# Patient Record
Sex: MALE | Race: WHITE | Employment: OTHER | URBAN - NONMETROPOLITAN AREA
[De-identification: names, ages, dates, MRNs, and addresses within clinical notes are randomized per-mention and may not be internally consistent; named-entity substitution may affect disease eponyms.]

---

## 2017-12-19 ENCOUNTER — HOSPITAL ENCOUNTER (OUTPATIENT)
Dept: CT IMAGING | Age: 77
Discharge: HOME | End: 2017-12-19
Payer: MEDICARE

## 2017-12-19 ENCOUNTER — HOSPITAL ENCOUNTER (OUTPATIENT)
Dept: NON INVASIVE DIAGNOSTICS | Age: 77
Discharge: HOME | End: 2017-12-19
Payer: MEDICARE

## 2017-12-19 DIAGNOSIS — Z87.891 PERSONAL HISTORY OF TOBACCO USE, PRESENTING HAZARDS TO HEALTH: ICD-10-CM

## 2017-12-19 PROCEDURE — G0297 LDCT FOR LUNG CA SCREEN: HCPCS

## 2017-12-19 PROCEDURE — 93880 EXTRACRANIAL BILAT STUDY: CPT

## 2018-06-18 ENCOUNTER — HOSPITAL ENCOUNTER (OUTPATIENT)
Dept: NON INVASIVE DIAGNOSTICS | Age: 78
Discharge: HOME | End: 2018-06-18
Payer: MEDICARE

## 2018-06-18 PROCEDURE — 93880 EXTRACRANIAL BILAT STUDY: CPT

## 2019-08-14 ENCOUNTER — HOSPITAL ENCOUNTER (OUTPATIENT)
Dept: VASCULAR LAB | Age: 79
Discharge: HOME | End: 2019-08-14
Payer: MEDICARE

## 2019-08-14 DIAGNOSIS — I65.21 STENOSIS OF RIGHT CAROTID ARTERY: Primary | ICD-10-CM

## 2019-08-14 PROCEDURE — 93880 EXTRACRANIAL BILAT STUDY: CPT

## 2019-11-14 RX ORDER — PANTOPRAZOLE SODIUM 40 MG/1
40 TABLET, DELAYED RELEASE ORAL DAILY
COMMUNITY

## 2019-11-14 RX ORDER — CLONIDINE HYDROCHLORIDE 0.1 MG/1
0.1 TABLET ORAL 2 TIMES DAILY
COMMUNITY

## 2019-11-14 RX ORDER — HYDROCODONE BITARTRATE AND ACETAMINOPHEN 10; 325 MG/1; MG/1
1 TABLET ORAL EVERY 6 HOURS PRN
Status: ON HOLD | COMMUNITY
End: 2020-05-15 | Stop reason: HOSPADM

## 2019-11-14 RX ORDER — ALBUTEROL SULFATE 90 UG/1
2 AEROSOL, METERED RESPIRATORY (INHALATION) EVERY 6 HOURS PRN
COMMUNITY

## 2019-11-14 RX ORDER — ATORVASTATIN CALCIUM 40 MG/1
40 TABLET, FILM COATED ORAL DAILY
COMMUNITY

## 2019-11-14 RX ORDER — CLOPIDOGREL BISULFATE 75 MG/1
75 TABLET ORAL DAILY
COMMUNITY
End: 2020-06-15 | Stop reason: SINTOL

## 2019-11-14 RX ORDER — METOPROLOL SUCCINATE 50 MG/1
50 TABLET, EXTENDED RELEASE ORAL DAILY
COMMUNITY

## 2019-11-14 RX ORDER — TAMSULOSIN HYDROCHLORIDE 0.4 MG/1
0.4 CAPSULE ORAL DAILY
COMMUNITY

## 2019-11-14 RX ORDER — GUAIFENESIN 600 MG/1
1200 TABLET, EXTENDED RELEASE ORAL 2 TIMES DAILY
COMMUNITY

## 2019-11-14 RX ORDER — LISINOPRIL 20 MG/1
20 TABLET ORAL DAILY
COMMUNITY

## 2019-11-14 SDOH — HEALTH STABILITY: MENTAL HEALTH: HOW OFTEN DO YOU HAVE A DRINK CONTAINING ALCOHOL?: NEVER

## 2019-11-20 ENCOUNTER — HOSPITAL ENCOUNTER (OUTPATIENT)
Dept: CT IMAGING | Age: 79
Discharge: HOME | End: 2019-11-20
Payer: MEDICARE

## 2019-11-20 DIAGNOSIS — I65.21 STENOSIS OF RIGHT CAROTID ARTERY: ICD-10-CM

## 2019-11-20 LAB
GFR NON-AFRICAN AMERICAN: 37
PERFORMED ON: ABNORMAL
POC CREATININE: 1.8 MG/DL (ref 0.3–1.3)
POC SAMPLE TYPE: ABNORMAL

## 2019-11-20 PROCEDURE — 70498 CT ANGIOGRAPHY NECK: CPT

## 2019-11-20 PROCEDURE — 82565 ASSAY OF CREATININE: CPT

## 2019-11-20 PROCEDURE — 6360000004 HC RX CONTRAST MEDICATION: Performed by: FAMILY MEDICINE

## 2019-11-20 RX ADMIN — IOPAMIDOL 90 ML: 755 INJECTION, SOLUTION INTRAVENOUS at 08:52

## 2019-11-21 ENCOUNTER — OFFICE VISIT (OUTPATIENT)
Dept: VASCULAR SURGERY | Age: 79
End: 2019-11-21
Payer: MEDICARE

## 2019-11-21 VITALS
HEART RATE: 59 BPM | RESPIRATION RATE: 18 BRPM | DIASTOLIC BLOOD PRESSURE: 80 MMHG | OXYGEN SATURATION: 92 % | SYSTOLIC BLOOD PRESSURE: 156 MMHG | TEMPERATURE: 97 F

## 2019-11-21 DIAGNOSIS — I65.23 BILATERAL CAROTID ARTERY STENOSIS: Primary | ICD-10-CM

## 2019-11-21 PROCEDURE — 4040F PNEUMOC VAC/ADMIN/RCVD: CPT | Performed by: NURSE PRACTITIONER

## 2019-11-21 PROCEDURE — G8421 BMI NOT CALCULATED: HCPCS | Performed by: NURSE PRACTITIONER

## 2019-11-21 PROCEDURE — G8427 DOCREV CUR MEDS BY ELIG CLIN: HCPCS | Performed by: NURSE PRACTITIONER

## 2019-11-21 PROCEDURE — 99203 OFFICE O/P NEW LOW 30 MIN: CPT | Performed by: NURSE PRACTITIONER

## 2019-11-21 PROCEDURE — 4004F PT TOBACCO SCREEN RCVD TLK: CPT | Performed by: NURSE PRACTITIONER

## 2019-11-21 PROCEDURE — G8598 ASA/ANTIPLAT THER USED: HCPCS | Performed by: NURSE PRACTITIONER

## 2019-11-21 PROCEDURE — 1123F ACP DISCUSS/DSCN MKR DOCD: CPT | Performed by: NURSE PRACTITIONER

## 2019-11-21 PROCEDURE — G8484 FLU IMMUNIZE NO ADMIN: HCPCS | Performed by: NURSE PRACTITIONER

## 2020-03-24 ENCOUNTER — OFFICE VISIT (OUTPATIENT)
Age: 80
End: 2020-03-24
Payer: MEDICARE

## 2020-03-24 LAB
INFLUENZA A ANTIBODY: NORMAL
INFLUENZA B ANTIBODY: NORMAL

## 2020-03-24 PROCEDURE — 4040F PNEUMOC VAC/ADMIN/RCVD: CPT | Performed by: NURSE PRACTITIONER

## 2020-03-24 PROCEDURE — 99213 OFFICE O/P EST LOW 20 MIN: CPT | Performed by: NURSE PRACTITIONER

## 2020-03-24 PROCEDURE — G8484 FLU IMMUNIZE NO ADMIN: HCPCS | Performed by: NURSE PRACTITIONER

## 2020-03-24 PROCEDURE — G8420 CALC BMI NORM PARAMETERS: HCPCS | Performed by: NURSE PRACTITIONER

## 2020-03-24 PROCEDURE — G8427 DOCREV CUR MEDS BY ELIG CLIN: HCPCS | Performed by: NURSE PRACTITIONER

## 2020-03-24 PROCEDURE — 1123F ACP DISCUSS/DSCN MKR DOCD: CPT | Performed by: NURSE PRACTITIONER

## 2020-03-24 PROCEDURE — 3023F SPIROM DOC REV: CPT | Performed by: NURSE PRACTITIONER

## 2020-03-24 PROCEDURE — G8926 SPIRO NO PERF OR DOC: HCPCS | Performed by: NURSE PRACTITIONER

## 2020-03-24 PROCEDURE — 4004F PT TOBACCO SCREEN RCVD TLK: CPT | Performed by: NURSE PRACTITIONER

## 2020-03-24 PROCEDURE — 87804 INFLUENZA ASSAY W/OPTIC: CPT | Performed by: NURSE PRACTITIONER

## 2020-03-24 RX ORDER — DOXYCYCLINE HYCLATE 100 MG
100 TABLET ORAL 2 TIMES DAILY
Qty: 20 TABLET | Refills: 0 | Status: SHIPPED | OUTPATIENT
Start: 2020-03-24 | End: 2020-04-03

## 2020-03-24 RX ORDER — METHYLPREDNISOLONE 4 MG/1
TABLET ORAL
Qty: 1 KIT | Refills: 0 | Status: SHIPPED | OUTPATIENT
Start: 2020-03-24 | End: 2020-03-30

## 2020-03-25 VITALS
SYSTOLIC BLOOD PRESSURE: 122 MMHG | WEIGHT: 130 LBS | BODY MASS INDEX: 19.7 KG/M2 | TEMPERATURE: 98.2 F | OXYGEN SATURATION: 96 % | DIASTOLIC BLOOD PRESSURE: 80 MMHG | HEIGHT: 68 IN | HEART RATE: 80 BPM

## 2020-05-05 ENCOUNTER — HOSPITAL ENCOUNTER (OUTPATIENT)
Dept: GENERAL RADIOLOGY | Age: 80
Discharge: HOME | End: 2020-05-05
Payer: MEDICARE

## 2020-05-05 PROCEDURE — 71046 X-RAY EXAM CHEST 2 VIEWS: CPT

## 2020-05-13 ENCOUNTER — APPOINTMENT (OUTPATIENT)
Dept: CT IMAGING | Age: 80
End: 2020-05-13
Payer: MEDICARE

## 2020-05-13 ENCOUNTER — APPOINTMENT (OUTPATIENT)
Dept: GENERAL RADIOLOGY | Age: 80
End: 2020-05-13
Payer: MEDICARE

## 2020-05-13 ENCOUNTER — HOSPITAL ENCOUNTER (OUTPATIENT)
Age: 80
Setting detail: OBSERVATION
Discharge: HOME | End: 2020-05-15
Attending: EMERGENCY MEDICINE | Admitting: FAMILY MEDICINE
Payer: MEDICARE

## 2020-05-13 PROBLEM — J18.9 PNEUMONIA: Status: ACTIVE | Noted: 2020-05-13

## 2020-05-13 LAB
ALBUMIN SERPL-MCNC: 3.9 G/DL (ref 3.5–5.2)
ALP BLD-CCNC: 90 U/L (ref 40–130)
ALT SERPL-CCNC: 9 U/L (ref 5–41)
ANION GAP SERPL CALCULATED.3IONS-SCNC: 13 MMOL/L (ref 7–19)
AST SERPL-CCNC: 14 U/L (ref 5–40)
BASOPHILS ABSOLUTE: 0 K/UL (ref 0–0.2)
BASOPHILS RELATIVE PERCENT: 0.5 % (ref 0–1)
BILIRUB SERPL-MCNC: 0.5 MG/DL (ref 0.2–1.2)
BUN BLDV-MCNC: 20 MG/DL (ref 8–23)
CALCIUM SERPL-MCNC: 9.2 MG/DL (ref 8.8–10.2)
CHLORIDE BLD-SCNC: 97 MMOL/L (ref 98–111)
CO2: 26 MMOL/L (ref 22–29)
CREAT SERPL-MCNC: 1.8 MG/DL (ref 0.5–1.2)
EOSINOPHILS ABSOLUTE: 0.3 K/UL (ref 0–0.6)
EOSINOPHILS RELATIVE PERCENT: 3.2 % (ref 0–5)
GFR NON-AFRICAN AMERICAN: 37
GLUCOSE BLD-MCNC: 132 MG/DL (ref 74–109)
HCT VFR BLD CALC: 37.5 % (ref 42–52)
HEMOGLOBIN: 12.1 G/DL (ref 14–18)
IMMATURE GRANULOCYTES #: 0 K/UL
LYMPHOCYTES ABSOLUTE: 1.4 K/UL (ref 1.1–4.5)
LYMPHOCYTES RELATIVE PERCENT: 18.1 % (ref 20–40)
MCH RBC QN AUTO: 31.5 PG (ref 27–31)
MCHC RBC AUTO-ENTMCNC: 32.3 G/DL (ref 33–37)
MCV RBC AUTO: 97.7 FL (ref 80–94)
MONOCYTES ABSOLUTE: 0.6 K/UL (ref 0–0.9)
MONOCYTES RELATIVE PERCENT: 8 % (ref 0–10)
NEUTROPHILS ABSOLUTE: 5.4 K/UL (ref 1.5–7.5)
NEUTROPHILS RELATIVE PERCENT: 69.8 % (ref 50–65)
PDW BLD-RTO: 13.5 % (ref 11.5–14.5)
PLATELET # BLD: 200 K/UL (ref 130–400)
PMV BLD AUTO: 10.3 FL (ref 9.4–12.4)
POTASSIUM REFLEX MAGNESIUM: 4.6 MMOL/L (ref 3.5–5)
PRO-BNP: 509 PG/ML (ref 0–1800)
RBC # BLD: 3.84 M/UL (ref 4.7–6.1)
SARS-COV-2, NAAT: NOT DETECTED
SODIUM BLD-SCNC: 136 MMOL/L (ref 136–145)
TOTAL PROTEIN: 7.2 G/DL (ref 6.6–8.7)
TROPONIN: <0.01 NG/ML (ref 0–0.03)
TROPONIN: <0.01 NG/ML (ref 0–0.03)
WBC # BLD: 7.7 K/UL (ref 4.8–10.8)

## 2020-05-13 PROCEDURE — G0378 HOSPITAL OBSERVATION PER HR: HCPCS

## 2020-05-13 PROCEDURE — 96375 TX/PRO/DX INJ NEW DRUG ADDON: CPT

## 2020-05-13 PROCEDURE — 6360000002 HC RX W HCPCS: Performed by: EMERGENCY MEDICINE

## 2020-05-13 PROCEDURE — 99285 EMERGENCY DEPT VISIT HI MDM: CPT

## 2020-05-13 PROCEDURE — 80053 COMPREHEN METABOLIC PANEL: CPT

## 2020-05-13 PROCEDURE — 2580000003 HC RX 258: Performed by: EMERGENCY MEDICINE

## 2020-05-13 PROCEDURE — 6360000004 HC RX CONTRAST MEDICATION: Performed by: EMERGENCY MEDICINE

## 2020-05-13 PROCEDURE — U0002 COVID-19 LAB TEST NON-CDC: HCPCS

## 2020-05-13 PROCEDURE — 83880 ASSAY OF NATRIURETIC PEPTIDE: CPT

## 2020-05-13 PROCEDURE — 71275 CT ANGIOGRAPHY CHEST: CPT

## 2020-05-13 PROCEDURE — 84484 ASSAY OF TROPONIN QUANT: CPT

## 2020-05-13 PROCEDURE — 2580000003 HC RX 258: Performed by: FAMILY MEDICINE

## 2020-05-13 PROCEDURE — 85025 COMPLETE CBC W/AUTO DIFF WBC: CPT

## 2020-05-13 PROCEDURE — 96374 THER/PROPH/DIAG INJ IV PUSH: CPT

## 2020-05-13 PROCEDURE — 96365 THER/PROPH/DIAG IV INF INIT: CPT

## 2020-05-13 PROCEDURE — 71045 X-RAY EXAM CHEST 1 VIEW: CPT

## 2020-05-13 PROCEDURE — 99999 PR OFFICE/OUTPT VISIT,PROCEDURE ONLY: CPT | Performed by: EMERGENCY MEDICINE

## 2020-05-13 PROCEDURE — 36415 COLL VENOUS BLD VENIPUNCTURE: CPT

## 2020-05-13 RX ORDER — CLONIDINE HYDROCHLORIDE 0.1 MG/1
0.1 TABLET ORAL 2 TIMES DAILY
Status: DISCONTINUED | OUTPATIENT
Start: 2020-05-13 | End: 2020-05-15 | Stop reason: HOSPADM

## 2020-05-13 RX ORDER — ONDANSETRON 2 MG/ML
4 INJECTION INTRAMUSCULAR; INTRAVENOUS ONCE
Status: COMPLETED | OUTPATIENT
Start: 2020-05-13 | End: 2020-05-13

## 2020-05-13 RX ORDER — MORPHINE SULFATE 4 MG/ML
2 INJECTION, SOLUTION INTRAMUSCULAR; INTRAVENOUS ONCE
Status: COMPLETED | OUTPATIENT
Start: 2020-05-13 | End: 2020-05-13

## 2020-05-13 RX ORDER — PANTOPRAZOLE SODIUM 40 MG/1
40 TABLET, DELAYED RELEASE ORAL DAILY
Status: DISCONTINUED | OUTPATIENT
Start: 2020-05-14 | End: 2020-05-15 | Stop reason: HOSPADM

## 2020-05-13 RX ORDER — CLOPIDOGREL BISULFATE 75 MG/1
75 TABLET ORAL DAILY
Status: DISCONTINUED | OUTPATIENT
Start: 2020-05-14 | End: 2020-05-15 | Stop reason: HOSPADM

## 2020-05-13 RX ORDER — ALBUTEROL SULFATE 2.5 MG/3ML
2.5 SOLUTION RESPIRATORY (INHALATION) EVERY 4 HOURS PRN
Status: DISCONTINUED | OUTPATIENT
Start: 2020-05-13 | End: 2020-05-15 | Stop reason: HOSPADM

## 2020-05-13 RX ORDER — SODIUM CHLORIDE, SODIUM LACTATE, POTASSIUM CHLORIDE, CALCIUM CHLORIDE 600; 310; 30; 20 MG/100ML; MG/100ML; MG/100ML; MG/100ML
1000 INJECTION, SOLUTION INTRAVENOUS ONCE
Status: COMPLETED | OUTPATIENT
Start: 2020-05-13 | End: 2020-05-13

## 2020-05-13 RX ORDER — METOPROLOL SUCCINATE 50 MG/1
50 TABLET, EXTENDED RELEASE ORAL DAILY
Status: DISCONTINUED | OUTPATIENT
Start: 2020-05-14 | End: 2020-05-15 | Stop reason: HOSPADM

## 2020-05-13 RX ORDER — TAMSULOSIN HYDROCHLORIDE 0.4 MG/1
0.4 CAPSULE ORAL DAILY
Status: DISCONTINUED | OUTPATIENT
Start: 2020-05-14 | End: 2020-05-15 | Stop reason: HOSPADM

## 2020-05-13 RX ORDER — ASPIRIN 81 MG/1
81 TABLET ORAL DAILY
Status: DISCONTINUED | OUTPATIENT
Start: 2020-05-14 | End: 2020-05-15 | Stop reason: HOSPADM

## 2020-05-13 RX ORDER — HYDROCODONE BITARTRATE AND ACETAMINOPHEN 10; 325 MG/1; MG/1
1 TABLET ORAL EVERY 6 HOURS PRN
Status: DISCONTINUED | OUTPATIENT
Start: 2020-05-13 | End: 2020-05-15 | Stop reason: HOSPADM

## 2020-05-13 RX ORDER — ATORVASTATIN CALCIUM 40 MG/1
40 TABLET, FILM COATED ORAL DAILY
Status: DISCONTINUED | OUTPATIENT
Start: 2020-05-14 | End: 2020-05-15 | Stop reason: HOSPADM

## 2020-05-13 RX ORDER — SODIUM CHLORIDE 0.9 % (FLUSH) 0.9 %
10 SYRINGE (ML) INJECTION EVERY 12 HOURS SCHEDULED
Status: DISCONTINUED | OUTPATIENT
Start: 2020-05-13 | End: 2020-05-15 | Stop reason: HOSPADM

## 2020-05-13 RX ORDER — LISINOPRIL 20 MG/1
20 TABLET ORAL DAILY
Status: DISCONTINUED | OUTPATIENT
Start: 2020-05-14 | End: 2020-05-15 | Stop reason: HOSPADM

## 2020-05-13 RX ORDER — GUAIFENESIN 600 MG/1
1200 TABLET, EXTENDED RELEASE ORAL 2 TIMES DAILY
Status: DISCONTINUED | OUTPATIENT
Start: 2020-05-13 | End: 2020-05-15 | Stop reason: HOSPADM

## 2020-05-13 RX ORDER — SODIUM CHLORIDE 0.9 % (FLUSH) 0.9 %
10 SYRINGE (ML) INJECTION PRN
Status: DISCONTINUED | OUTPATIENT
Start: 2020-05-13 | End: 2020-05-15 | Stop reason: HOSPADM

## 2020-05-13 RX ORDER — ACETAMINOPHEN 325 MG/1
650 TABLET ORAL EVERY 4 HOURS PRN
Status: DISCONTINUED | OUTPATIENT
Start: 2020-05-13 | End: 2020-05-15 | Stop reason: HOSPADM

## 2020-05-13 RX ADMIN — ONDANSETRON 4 MG: 2 INJECTION INTRAMUSCULAR; INTRAVENOUS at 17:59

## 2020-05-13 RX ADMIN — Medication 500 MG: at 16:31

## 2020-05-13 RX ADMIN — SODIUM CHLORIDE, POTASSIUM CHLORIDE, SODIUM LACTATE AND CALCIUM CHLORIDE 1000 ML: 600; 310; 30; 20 INJECTION, SOLUTION INTRAVENOUS at 16:32

## 2020-05-13 RX ADMIN — IOPAMIDOL 90 ML: 755 INJECTION, SOLUTION INTRAVENOUS at 17:24

## 2020-05-13 RX ADMIN — Medication 10 ML: at 22:52

## 2020-05-13 RX ADMIN — CEFTRIAXONE 1 G: 1 INJECTION, POWDER, FOR SOLUTION INTRAMUSCULAR; INTRAVENOUS at 16:31

## 2020-05-13 RX ADMIN — MORPHINE SULFATE 2 MG: 4 INJECTION INTRAVENOUS at 17:59

## 2020-05-13 ASSESSMENT — PAIN SCALES - GENERAL
PAINLEVEL_OUTOF10: 0
PAINLEVEL_OUTOF10: 5
PAINLEVEL_OUTOF10: 6

## 2020-05-13 ASSESSMENT — ENCOUNTER SYMPTOMS
VOICE CHANGE: 0
SHORTNESS OF BREATH: 1
EYE PAIN: 0
RHINORRHEA: 0
VOMITING: 0
COUGH: 0
ABDOMINAL PAIN: 0
EYE REDNESS: 0
DIARRHEA: 0

## 2020-05-13 NOTE — ED PROVIDER NOTES
[facility]  EMERGENCY DEPARTMENT ENCOUNTER      Pt Name: Thai Polanco  MRN: [id number]  [place] 1940  Date of evaluation: 5/13/2020  Provider: Marina Garduno MD    CHIEF COMPLAINT       Chief Complaint   Patient presents with    Chest Pain     since march, worse today. took 81 asa at 0400. no nitro. hx of cabg and stents. HISTORY OF PRESENT ILLNESS   (Location/Symptom, Timing/Onset,Context/Setting, Quality, Duration, Modifying Factors, Severity)  Note limiting factors. Thai Polanco is a 68 y.o. male who presents to the emergency department for evaluation of chest pain and shortness of breath that is been present over the last 2 months and gradually worsening. States the pain is sharp in the lateral left chest with radiation to the upper left chest as well. States that recently pain is present all the time but worsens with exertion. States he can walk a few steps across the room okay but if he has to walk further than that he gets short of breath. Symptoms do0 improved with rest.  Patient does have significant cardiac history with a previous CABG x6 in 2004. Has not had any recent stress test or heart catheterizations. Patient has contacted his primary care doctor chest x-ray performed which showed a possible early pneumonia and he was given antibiotics for that but had no improvement. HPI    NursingNotes were reviewed. REVIEW OF SYSTEMS    (2-9 systems for level 4, 10 or more for level 5)     Review of Systems   Constitutional: Negative for fatigue and fever. HENT: Negative for congestion, rhinorrhea and voice change. Eyes: Negative for pain and redness. Respiratory: Positive for shortness of breath. Negative for cough. Cardiovascular: Positive for chest pain. Gastrointestinal: Negative for abdominal pain, diarrhea and vomiting. Endocrine: Negative. Genitourinary: Negative. Musculoskeletal: Negative for arthralgias and gait problem.    Skin: mg by mouth daily    PANTOPRAZOLE (PROTONIX) 40 MG TABLET    Take 40 mg by mouth daily    TAMSULOSIN (FLOMAX) 0.4 MG CAPSULE    Take 0.4 mg by mouth daily       ALLERGIES     Amoxicillin-pot clavulanate    FAMILY HISTORY       Family History   Problem Relation Age of Onset    Hypotension Father     Heart Disease Brother           SOCIAL HISTORY       Social History     Socioeconomic History    Marital status:      Spouse name: None    Number of children: None    Years of education: None    Highest education level: None   Occupational History    None   Social Needs    Financial resource strain: None    Food insecurity     Worry: None     Inability: None    Transportation needs     Medical: None     Non-medical: None   Tobacco Use    Smoking status: Current Every Day Smoker     Packs/day: 1.00    Smokeless tobacco: Current User   Substance and Sexual Activity    Alcohol use: Never     Frequency: Never    Drug use: Never    Sexual activity: None   Lifestyle    Physical activity     Days per week: None     Minutes per session: None    Stress: None   Relationships    Social connections     Talks on phone: None     Gets together: None     Attends Sikh service: None     Active member of club or organization: None     Attends meetings of clubs or organizations: None     Relationship status: None    Intimate partner violence     Fear of current or ex partner: None     Emotionally abused: None     Physically abused: None     Forced sexual activity: None   Other Topics Concern    None   Social History Narrative    None       SCREENINGS             PHYSICAL EXAM    (up to 7 for level 4, 8 or more for level 5)     ED Triage Vitals [05/13/20 1450]   BP Temp Temp Source Pulse Resp SpO2 Height Weight   (!) 175/81 98.3 °F (36.8 °C) Oral 67 20 94 % -- 130 lb (59 kg)       Physical Exam  Vitals signs and nursing note reviewed. Constitutional:       General: He is not in acute distress. Appearance: He is well-developed. He is not diaphoretic. HENT:      Head: Normocephalic and atraumatic. Eyes:      General: No scleral icterus. Neck:      Vascular: No JVD. Cardiovascular:      Rate and Rhythm: Normal rate and regular rhythm. Pulses:           Radial pulses are 2+ on the right side and 2+ on the left side. Dorsalis pedis pulses are 2+ on the right side and 2+ on the left side. Heart sounds: Normal heart sounds. No murmur. No friction rub. No gallop. Pulmonary:      Effort: Pulmonary effort is normal. No accessory muscle usage or respiratory distress. Breath sounds: Normal breath sounds. No stridor. No decreased breath sounds, wheezing, rhonchi or rales. Chest:      Chest wall: No tenderness. Comments: Vertical midline scar  Abdominal:      General: There is no distension. Palpations: Abdomen is soft. Tenderness: There is no abdominal tenderness. There is no guarding or rebound. Musculoskeletal: Normal range of motion. General: No deformity. Right lower leg: No edema. Left lower leg: No edema. Skin:     General: Skin is warm and dry. Coloration: Skin is not pale. Findings: No erythema. Neurological:      Mental Status: He is alert and oriented to person, place, and time. GCS: GCS eye subscore is 4. GCS verbal subscore is 5. GCS motor subscore is 6. Cranial Nerves: No cranial nerve deficit. Motor: No abnormal muscle tone.       Coordination: Coordination normal.   Psychiatric:         Behavior: Behavior normal.         Judgment: Judgment normal.         DIAGNOSTIC RESULTS     EKG: All EKG's are interpreted by the Emergency Department Physician who either signs or Co-signs this chart in the absence of a cardiologist.      RADIOLOGY:   Non-plain film images such as CT, Ultrasound and MRI are read by the radiologist. Plainradiographic images are visualized and preliminarily interpreted by the emergency CTA ordered to rule out associated DVT, lung mass, or other underlying issues. [CEDRIC]      ED Course User Index  [CEDRIC] Marina Tim MD     Patient checked out to oncoming physician pending CTA of the chest to further delineate chest x-ray findings. Based on the evaluation and work-up here patient is felt to require further monitoring, work-up, or treatment that is available in the emergency department. Case was discussed with Dr. Tresa Thayer who agrees for observation or admission for further management. Treatment and stabilization as necessary were provided in the emergency department prior to transfer of care to the family medicine service. CONSULTS:  None    PROCEDURES:  Unless otherwise notedbelow, none     Procedures    FINAL IMPRESSION     1. Chest pain, unspecified type    2. Exertional shortness of breath    3. Acute renal insufficiency    4. Pleural effusion, left    5. Failure of outpatient treatment    6. Left lower lobe consolidation ([address]    7. Hx of CABG          DISPOSITION/PLAN   DISPOSITION        PATIENT REFERRED TO:  No follow-up provider specified.     DISCHARGE MEDICATIONS:  New Prescriptions    No medications on file          (Please note that portions of this note were completed with a voice recognition program.  Efforts were made to edit the dictations butoccasionally words are mis-transcribed.)    Marina Garduno MD (electronically signed)  Emergency Physician         Marina Tim MD  05/16/20 1773

## 2020-05-14 ENCOUNTER — APPOINTMENT (OUTPATIENT)
Dept: CT IMAGING | Age: 80
End: 2020-05-14
Payer: MEDICARE

## 2020-05-14 PROCEDURE — 96366 THER/PROPH/DIAG IV INF ADDON: CPT

## 2020-05-14 PROCEDURE — 6370000000 HC RX 637 (ALT 250 FOR IP): Performed by: FAMILY MEDICINE

## 2020-05-14 PROCEDURE — G0378 HOSPITAL OBSERVATION PER HR: HCPCS

## 2020-05-14 PROCEDURE — 2580000003 HC RX 258: Performed by: FAMILY MEDICINE

## 2020-05-14 PROCEDURE — 6360000002 HC RX W HCPCS: Performed by: FAMILY MEDICINE

## 2020-05-14 PROCEDURE — 6360000004 HC RX CONTRAST MEDICATION: Performed by: FAMILY MEDICINE

## 2020-05-14 PROCEDURE — 96376 TX/PRO/DX INJ SAME DRUG ADON: CPT

## 2020-05-14 PROCEDURE — 94640 AIRWAY INHALATION TREATMENT: CPT

## 2020-05-14 PROCEDURE — 70460 CT HEAD/BRAIN W/DYE: CPT

## 2020-05-14 PROCEDURE — 96375 TX/PRO/DX INJ NEW DRUG ADDON: CPT

## 2020-05-14 RX ORDER — IPRATROPIUM BROMIDE AND ALBUTEROL SULFATE 2.5; .5 MG/3ML; MG/3ML
1 SOLUTION RESPIRATORY (INHALATION)
Status: DISCONTINUED | OUTPATIENT
Start: 2020-05-14 | End: 2020-05-14

## 2020-05-14 RX ORDER — OXYCODONE HYDROCHLORIDE AND ACETAMINOPHEN 5; 325 MG/1; MG/1
2 TABLET ORAL EVERY 4 HOURS PRN
Status: DISCONTINUED | OUTPATIENT
Start: 2020-05-14 | End: 2020-05-15 | Stop reason: HOSPADM

## 2020-05-14 RX ORDER — IPRATROPIUM BROMIDE AND ALBUTEROL SULFATE 2.5; .5 MG/3ML; MG/3ML
1 SOLUTION RESPIRATORY (INHALATION)
Status: DISCONTINUED | OUTPATIENT
Start: 2020-05-14 | End: 2020-05-15 | Stop reason: HOSPADM

## 2020-05-14 RX ORDER — HYDROMORPHONE HYDROCHLORIDE 1 MG/ML
0.5 INJECTION, SOLUTION INTRAMUSCULAR; INTRAVENOUS; SUBCUTANEOUS EVERY 4 HOURS PRN
Status: DISCONTINUED | OUTPATIENT
Start: 2020-05-14 | End: 2020-05-15 | Stop reason: HOSPADM

## 2020-05-14 RX ADMIN — HYDROMORPHONE HYDROCHLORIDE 0.5 MG: 1 INJECTION, SOLUTION INTRAMUSCULAR; INTRAVENOUS; SUBCUTANEOUS at 20:40

## 2020-05-14 RX ADMIN — METOPROLOL SUCCINATE 50 MG: 50 TABLET, EXTENDED RELEASE ORAL at 10:00

## 2020-05-14 RX ADMIN — Medication 10 ML: at 10:01

## 2020-05-14 RX ADMIN — ASPIRIN 81 MG: 81 TABLET, COATED ORAL at 10:00

## 2020-05-14 RX ADMIN — CLOPIDOGREL BISULFATE 75 MG: 75 TABLET ORAL at 09:59

## 2020-05-14 RX ADMIN — Medication 10 ML: at 20:39

## 2020-05-14 RX ADMIN — PANTOPRAZOLE SODIUM 40 MG: 40 TABLET, DELAYED RELEASE ORAL at 10:00

## 2020-05-14 RX ADMIN — IPRATROPIUM BROMIDE AND ALBUTEROL SULFATE 1 AMPULE: .5; 3 SOLUTION RESPIRATORY (INHALATION) at 19:14

## 2020-05-14 RX ADMIN — GUAIFENESIN 1200 MG: 600 TABLET, EXTENDED RELEASE ORAL at 20:40

## 2020-05-14 RX ADMIN — TAMSULOSIN HYDROCHLORIDE 0.4 MG: 0.4 CAPSULE ORAL at 10:00

## 2020-05-14 RX ADMIN — CLONIDINE HYDROCHLORIDE 0.1 MG: 0.1 TABLET ORAL at 20:40

## 2020-05-14 RX ADMIN — IOPAMIDOL 90 ML: 755 INJECTION, SOLUTION INTRAVENOUS at 20:16

## 2020-05-14 RX ADMIN — AZITHROMYCIN DIHYDRATE 500 MG: 500 INJECTION, POWDER, LYOPHILIZED, FOR SOLUTION INTRAVENOUS at 18:02

## 2020-05-14 RX ADMIN — SODIUM CHLORIDE, PRESERVATIVE FREE 1 G: 5 INJECTION INTRAVENOUS at 18:02

## 2020-05-14 RX ADMIN — GUAIFENESIN 1200 MG: 600 TABLET, EXTENDED RELEASE ORAL at 09:59

## 2020-05-14 RX ADMIN — LISINOPRIL 20 MG: 20 TABLET ORAL at 09:59

## 2020-05-14 RX ADMIN — HYDROCODONE BITARTRATE AND ACETAMINOPHEN 1 TABLET: 10; 325 TABLET ORAL at 10:05

## 2020-05-14 RX ADMIN — CLONIDINE HYDROCHLORIDE 0.1 MG: 0.1 TABLET ORAL at 10:00

## 2020-05-14 RX ADMIN — ATORVASTATIN CALCIUM 40 MG: 40 TABLET, FILM COATED ORAL at 10:00

## 2020-05-14 ASSESSMENT — PAIN SCALES - GENERAL
PAINLEVEL_OUTOF10: 4
PAINLEVEL_OUTOF10: 7
PAINLEVEL_OUTOF10: 0

## 2020-05-14 NOTE — PROGRESS NOTES
Called Dr. Srikanth Campos answering service to notify her of pts arrival to the floor. They said they would text her.    Electronically signed by Arti Fallon RN on 5/13/2020 at 9:23 PM

## 2020-05-14 NOTE — PROGRESS NOTES
Spoke with Dr. Tiara Loaiza at [phone]. She is aware that the pt has arrived to the floor.    Electronically signed by Arti Fallon RN on 5/13/2020 at 9:41 PM

## 2020-05-14 NOTE — PROGRESS NOTES
Pharmacy Renal Adjustment    Narayan Tay is a 68 y.o. male. Pharmacy has renally adjusted medications per protocol. Recent Labs     05/13/20  1455   BUN 20       Recent Labs     05/13/20  1455   CREATININE 1.8*       Estimated Creatinine Clearance: 25 mL/min (A) (based on SCr of 1.8 mg/dL (H)).     Height:   Ht Readings from Last 1 Encounters:   05/13/20 5' 8\" (1.727 m)     Weight:  Wt Readings from Last 1 Encounters:   05/13/20 117 lb (53.1 kg)       Plan: Adjust the following medications based on renal function:           Lovenox 40 mg SC daily to 30 mg SC daily    Electronically signed by Leah Crawford [facility] on 5/14/2020 at 8:28 AM

## 2020-05-14 NOTE — PROGRESS NOTES
Radha Uriarte arrived to room # 311. Presented with: pneumonia  Mental Status: Patient is oriented, alert, coherent, logical, thought processes intact and able to concentrate and follow conversation. Vitals:    05/13/20 1908   BP: (!) 174/75   Pulse: 64   Resp: 16   Temp: 97.7 °F (36.5 °C)   SpO2: 92%     Patient safety contract and falls prevention contract reviewed with patient Yes. Oriented Patient to room. Call light within reach. Yes.   Needs, issues or concerns expressed at this time: no.      Electronically signed by Ami Dale RN on 5/13/2020 at 7:14 PM

## 2020-05-14 NOTE — H&P
75 mg by mouth daily   Yes Historical Provider, MD   metoprolol succinate (TOPROL XL) 50 MG extended release tablet Take 50 mg by mouth daily   Yes Historical Provider, MD   pantoprazole (PROTONIX) 40 MG tablet Take 40 mg by mouth daily   Yes Historical Provider, MD   lisinopril (PRINIVIL;ZESTRIL) 20 MG tablet Take 20 mg by mouth daily   Yes Historical Provider, MD   HYDROcodone-acetaminophen (NORCO)  MG per tablet Take 1 tablet by mouth every 6 hours as needed for Pain. Yes Historical Provider, MD   atorvastatin (LIPITOR) 40 MG tablet Take 40 mg by mouth daily   Yes Historical Provider, MD   tamsulosin (FLOMAX) 0.4 MG capsule Take 0.4 mg by mouth daily   Yes Historical Provider, MD       Allergies:  Amoxicillin-pot clavulanate    Social History:   Social History     Socioeconomic History    Marital status:       Spouse name: Not on file    Number of children: Not on file    Years of education: Not on file    Highest education level: Not on file   Occupational History    Not on file   Social Needs    Financial resource strain: Not on file    Food insecurity     Worry: Not on file     Inability: Not on file    Transportation needs     Medical: Not on file     Non-medical: Not on file   Tobacco Use    Smoking status: Current Every Day Smoker     Packs/day: 1.00    Smokeless tobacco: Current User   Substance and Sexual Activity    Alcohol use: Never     Frequency: Never    Drug use: Never    Sexual activity: Not on file   Lifestyle    Physical activity     Days per week: Not on file     Minutes per session: Not on file    Stress: Not on file   Relationships    Social connections     Talks on phone: Not on file     Gets together: Not on file     Attends [employer] service: Not on file     Active member of club or organization: Not on file     Attends meetings of clubs or organizations: Not on file     Relationship status: Not on file    Intimate partner violence     Fear of current or ex

## 2020-05-15 ENCOUNTER — OUTSIDE FACILITY SERVICE (OUTPATIENT)
Dept: PULMONOLOGY | Facility: CLINIC | Age: 80
End: 2020-05-15

## 2020-05-15 ENCOUNTER — APPOINTMENT (OUTPATIENT)
Dept: NUCLEAR MEDICINE | Age: 80
End: 2020-05-15
Payer: MEDICARE

## 2020-05-15 ENCOUNTER — APPOINTMENT (OUTPATIENT)
Dept: ULTRASOUND IMAGING | Age: 80
End: 2020-05-15
Payer: MEDICARE

## 2020-05-15 VITALS
DIASTOLIC BLOOD PRESSURE: 73 MMHG | WEIGHT: 117 LBS | RESPIRATION RATE: 16 BRPM | OXYGEN SATURATION: 93 % | HEIGHT: 68 IN | HEART RATE: 79 BPM | BODY MASS INDEX: 17.73 KG/M2 | SYSTOLIC BLOOD PRESSURE: 123 MMHG | TEMPERATURE: 97.7 F

## 2020-05-15 PROCEDURE — 88305 TISSUE EXAM BY PATHOLOGIST: CPT

## 2020-05-15 PROCEDURE — 96372 THER/PROPH/DIAG INJ SC/IM: CPT

## 2020-05-15 PROCEDURE — A9561 TC99M OXIDRONATE: HCPCS | Performed by: FAMILY MEDICINE

## 2020-05-15 PROCEDURE — 88341 IMHCHEM/IMCYTCHM EA ADD ANTB: CPT

## 2020-05-15 PROCEDURE — 78306 BONE IMAGING WHOLE BODY: CPT

## 2020-05-15 PROCEDURE — 99222 1ST HOSP IP/OBS MODERATE 55: CPT | Performed by: NURSE PRACTITIONER

## 2020-05-15 PROCEDURE — 88177 CYTP FNA EVAL EA ADDL: CPT

## 2020-05-15 PROCEDURE — 6370000000 HC RX 637 (ALT 250 FOR IP): Performed by: FAMILY MEDICINE

## 2020-05-15 PROCEDURE — 94640 AIRWAY INHALATION TREATMENT: CPT

## 2020-05-15 PROCEDURE — 99223 1ST HOSP IP/OBS HIGH 75: CPT | Performed by: INTERNAL MEDICINE

## 2020-05-15 PROCEDURE — 38505 NEEDLE BIOPSY LYMPH NODES: CPT

## 2020-05-15 PROCEDURE — 88342 IMHCHEM/IMCYTCHM 1ST ANTB: CPT

## 2020-05-15 PROCEDURE — 88173 CYTOPATH EVAL FNA REPORT: CPT

## 2020-05-15 PROCEDURE — 88172 CYTP DX EVAL FNA 1ST EA SITE: CPT

## 2020-05-15 PROCEDURE — G0378 HOSPITAL OBSERVATION PER HR: HCPCS

## 2020-05-15 PROCEDURE — 99204 OFFICE O/P NEW MOD 45 MIN: CPT | Performed by: INTERNAL MEDICINE

## 2020-05-15 PROCEDURE — 6360000002 HC RX W HCPCS: Performed by: FAMILY MEDICINE

## 2020-05-15 PROCEDURE — 3430000000 HC RX DIAGNOSTIC RADIOPHARMACEUTICAL: Performed by: FAMILY MEDICINE

## 2020-05-15 RX ORDER — BISACODYL 10 MG
10 SUPPOSITORY, RECTAL RECTAL DAILY PRN
Status: DISCONTINUED | OUTPATIENT
Start: 2020-05-15 | End: 2020-05-15 | Stop reason: HOSPADM

## 2020-05-15 RX ORDER — OXYCODONE HYDROCHLORIDE AND ACETAMINOPHEN 5; 325 MG/1; MG/1
2 TABLET ORAL EVERY 4 HOURS PRN
Qty: 30 TABLET | Refills: 0 | Status: SHIPPED | OUTPATIENT
Start: 2020-05-15 | End: 2020-05-18

## 2020-05-15 RX ADMIN — PANTOPRAZOLE SODIUM 40 MG: 40 TABLET, DELAYED RELEASE ORAL at 08:43

## 2020-05-15 RX ADMIN — ASPIRIN 81 MG: 81 TABLET, COATED ORAL at 08:43

## 2020-05-15 RX ADMIN — IPRATROPIUM BROMIDE AND ALBUTEROL SULFATE 1 AMPULE: .5; 3 SOLUTION RESPIRATORY (INHALATION) at 14:31

## 2020-05-15 RX ADMIN — LISINOPRIL 20 MG: 20 TABLET ORAL at 08:43

## 2020-05-15 RX ADMIN — CLONIDINE HYDROCHLORIDE 0.1 MG: 0.1 TABLET ORAL at 08:43

## 2020-05-15 RX ADMIN — TAMSULOSIN HYDROCHLORIDE 0.4 MG: 0.4 CAPSULE ORAL at 08:43

## 2020-05-15 RX ADMIN — CLOPIDOGREL BISULFATE 75 MG: 75 TABLET ORAL at 08:43

## 2020-05-15 RX ADMIN — Medication 0.5 BOTTLE: at 14:15

## 2020-05-15 RX ADMIN — ATORVASTATIN CALCIUM 40 MG: 40 TABLET, FILM COATED ORAL at 08:43

## 2020-05-15 RX ADMIN — IPRATROPIUM BROMIDE AND ALBUTEROL SULFATE 1 AMPULE: .5; 3 SOLUTION RESPIRATORY (INHALATION) at 07:12

## 2020-05-15 RX ADMIN — BISACODYL 10 MG: 5 TABLET, COATED ORAL at 09:23

## 2020-05-15 RX ADMIN — BISACODYL 10 MG: 10 SUPPOSITORY RECTAL at 15:58

## 2020-05-15 RX ADMIN — METOPROLOL SUCCINATE 50 MG: 50 TABLET, EXTENDED RELEASE ORAL at 08:43

## 2020-05-15 RX ADMIN — GUAIFENESIN 1200 MG: 600 TABLET, EXTENDED RELEASE ORAL at 08:43

## 2020-05-15 RX ADMIN — IPRATROPIUM BROMIDE AND ALBUTEROL SULFATE 1 AMPULE: .5; 3 SOLUTION RESPIRATORY (INHALATION) at 10:17

## 2020-05-15 RX ADMIN — TECHNETIUM TC 99M OXIDRONATE 20 MILLICURIE: 3.15 INJECTION, POWDER, LYOPHILIZED, FOR SOLUTION INTRAVENOUS at 13:06

## 2020-05-15 RX ADMIN — ENOXAPARIN SODIUM 30 MG: 30 INJECTION SUBCUTANEOUS at 08:43

## 2020-05-15 ASSESSMENT — ENCOUNTER SYMPTOMS
VOMITING: 0
COUGH: 1
WHEEZING: 0
DIARRHEA: 0
SORE THROAT: 0
NAUSEA: 0
EYE ITCHING: 0
EYE DISCHARGE: 0
BACK PAIN: 1
ABDOMINAL PAIN: 1
CONSTIPATION: 1
BACK PAIN: 0
SHORTNESS OF BREATH: 1

## 2020-05-15 NOTE — DISCHARGE SUMMARY
Hospital Discharge Summary    Leandro Hamilton  :  1940  MRN:  [id number]    Admit date:  2020  Discharge date:   5/15/2020      Admitting Physician:    Amy Dias MD    Discharge Diagnoses:    6 cm LEFT HILAR LUNG MASS/LEFT SUPRACLAVICULAR ADENOPATHY/LIVER METS  RIGHT PULMONARY NODULES  SEVERE COPD  Active Problems:    Pneumonia  Resolved Problems:    * No resolved hospital problems. [facility] Course: The patient was admitted for the above noted medical/surgical issues. Please see daily progress note for further details concerning their stay. The patient improved throughout their stay and reached maximum medical improvement on the day of discharge. The patient/family agree with the treatment plan as outlined above. All questions concerning their stay were answered prior to discharge. They understand the importance of follow up concerning any abnormal test results.      Discharge Medications:       Oneida Krause   [place] Medication Instructions SRK:[id number]    Printed on:05/15/20 3674   Medication Information                      albuterol sulfate  (90 Base) MCG/ACT inhaler  Inhale 2 puffs into the lungs every 6 hours as needed for Wheezing             aspirin 81 MG tablet  Take 81 mg by mouth daily             atorvastatin (LIPITOR) 40 MG tablet  Take 40 mg by mouth daily             cloNIDine (CATAPRES) 0.1 MG tablet  Take 0.1 mg by mouth 2 times daily             clopidogrel (PLAVIX) 75 MG tablet  Take 75 mg by mouth daily             guaiFENesin (MUCINEX) 600 MG extended release tablet  Take 1,200 mg by mouth 2 times daily             lisinopril (PRINIVIL;ZESTRIL) 20 MG tablet  Take 20 mg by mouth daily             metFORMIN (GLUCOPHAGE) 1000 MG tablet  Take 1,000 mg by mouth 2 times daily (with meals)             metoprolol succinate (TOPROL XL) 50 MG extended release tablet  Take 50 mg by mouth daily             oxyCODONE-acetaminophen (PERCOCET) 5-325 MG per tablet  Take

## 2020-05-15 NOTE — ACP (ADVANCE CARE PLANNING)
Advance Care Planning       The patient has appointed the following active healthcare agents:   Bianca Pederson [phone]      The Patient has the following current code status:    Code Status: [facility]    Visit Documentation:  Code status confirmed.      ROOSEVELT Navarrete - ESTELLE  5/15/2020

## 2020-05-15 NOTE — CONSULTS
Life-Threatening [] Serious [] Nonlife-threatening [] Not serious   [x] Not discussed    DIET GENERAL;    Medications:    Current Facility-Administered Medications   Medication Dose Route Frequency Provider Last Rate Last Dose    bisacodyl (DULCOLAX) EC tablet 10 mg  10 mg Oral Daily PRN Micah Cisneros MD   10 mg at 05/15/20 9354    Citrate of Magnesia SOLN 0.5 Bottle  0.5 Bottle Oral PRN Micah Cisneros MD        enoxaparin (LOVENOX) injection 30 mg  30 mg Subcutaneous Daily Eden Robins MD   30 mg at 05/15/20 0843    HYDROmorphone HCl PF (DILAUDID) injection 0.5 mg  0.5 mg Intravenous Q4H PRN Micah Cisneros MD   0.5 mg at 05/14/20 2040    oxyCODONE-acetaminophen (PERCOCET) 5-325 MG per tablet 2 tablet  2 tablet Oral Q4H PRN Micah Cisneros MD        ipratropium-albuterol (DUONEB) nebulizer solution 1 ampule  1 ampule Inhalation Q4H WA Micah Cisneros MD   1 ampule at 05/15/20 1017    sodium chloride flush 0.9 % injection 10 mL  10 mL Intravenous 2 times per day Eden Robins MD   10 mL at 05/14/20 2039    sodium chloride flush 0.9 % injection 10 mL  10 mL Intravenous PRN Eden Robins MD        acetaminophen (TYLENOL) tablet 650 mg  650 mg Oral Q4H PRN Eden Robins MD        albuterol (PROVENTIL) nebulizer solution 2.5 mg  2.5 mg Nebulization Q4H PRN Eden Robins MD        aspirin EC tablet 81 mg  81 mg Oral Daily Eden Robins MD   81 mg at 05/15/20 0843    atorvastatin (LIPITOR) tablet 40 mg  40 mg Oral Daily Eden Robins MD   40 mg at 05/15/20 9505    cloNIDine (CATAPRES) tablet 0.1 mg  0.1 mg Oral BID Eden Robins MD   0.1 mg at 05/15/20 0843    clopidogrel (PLAVIX) tablet 75 mg  75 mg Oral Daily Eden Robins MD   75 mg at 05/15/20 0843    guaiFENesin (MUCINEX) extended release tablet 1,200 mg  1,200 mg Oral BID Eden Robins MD   1,200 mg at 05/15/20 0893    HYDROcodone-acetaminophen (NORCO)  MG per tablet 1 tablet  1 tablet Oral Q6H normal,lips normal.  Neck: supple, no JVD  Lungs: no increased work of breathing, lungs clear  Heart: regular rate and rhythm, S1, S2 normal, no murmur, click, rub or gallop   Abdomen: soft, non-tender, bowel sounds active  Genitourinary: No bladder fullness, masses, or tenderness  Extremities: no edema  Neurologic: No focal neurologic deficits, normal sensation, no tremor, alert and oriented  Psychiatric: Alert and oriented, no recent or remote memory deficits, good judgement, mood and affect appropriate  Skin: no rashes,lesions, or nodules. Assessment and Plan: Active Problems:    Pneumonia  Resolved Problems:    * No resolved hospital problems. *      Visit Summary:  Patient was seen at the bedside. He is alert and oriented. He complains of no bowel movement since Tuesday. He usually takes a stool softener home. He reports being unable to eat due to feeling constipated. He has good insight regarding his current health status. He reports knowing he likely has lung cancer with mets to liver and lymph nodes. He is awaiting FNA of lymph node. He is familiar somewhat with lung cancer as his wife  of lung cancer in  after being treated for 2 1/2 years. He lives alone. His daughter lives in [address] [place] [address] and his son lives in [place]. He reports they know very little about his current health issues and he does not want to tell them about having cancer yet. We have discussed goals of care and he wants to try any palliative treatment that oncology feels will give him better quality of life. He states, \"I'll try a round or two of whatever they suggest and if it doesn't help me feel better, I'll quit. I don't have any desire to have treatment and prolong my life by a few weeks if I have to suffer the whole time. \"   Code status discussed and he wants to be DNR. He does not have an AD/LW, but states he'll probably want to do this soon and would like to review the documents.   He prefers that his son make decisions for him if he is not able. He reports during previous conversations his daughter has stated she does not want to make decisions for him. All questions answered, support given. Recommendations:   Patient's current goals of care include proceeding with FNA and consideration of treatment to palliate pain and symptoms. DNR per his request.  His would like to review advance directive/living will documents and then consider completing them. He asks that no information other than his general status be shared with his children until he is ready to tell them about having cancer. Monitor pain control and adjust pain medications as needed. Discuss with nursing his need for relief of constipation (prn meds available). Thank you for consulting palliative care and allowing us to participate in the care of the patient. CounselingTopics: Goals of care, Code Status    Time Spent Counseling:                                          Total Face to Face Time:   Time Spent Reviewing Records/Provider Communication:  Total Time Spent:     Electronically signed by ROOSEVELT Quiroga CNP on 5/15/2020 at 1:48 PM    (Please note that portions of this note were completed with a voice recognition program.  Efforts were made to edit the dictations but occasionally words are mis-transcribed.)

## 2020-05-15 NOTE — CONSULTS
History:   Procedure Laterality Date    CARDIAC CATHETERIZATION  09/2005    CAROTID ENDARTERECTOMY      CATARACT REMOVAL WITH IMPLANT      CHOLECYSTECTOMY, LAPAROSCOPIC  04/22/2011    with oversew of preforated peptic ulcer with joel patch closure     CORONARY ARTERY BYPASS GRAFT         Social History:    Social History     Socioeconomic History    Marital status:       Spouse name: Not on file    Number of children: Not on file    Years of education: Not on file    Highest education level: Not on file   Occupational History    Not on file   Social Needs    Financial resource strain: Not on file    Food insecurity     Worry: Not on file     Inability: Not on file    Transportation needs     Medical: Not on file     Non-medical: Not on file   Tobacco Use    Smoking status: Current Every Day Smoker     Packs/day: 1.00    Smokeless tobacco: Current User   Substance and Sexual Activity    Alcohol use: Never     Frequency: Never    Drug use: Never    Sexual activity: Not on file   Lifestyle    Physical activity     Days per week: Not on file     Minutes per session: Not on file    Stress: Not on file   Relationships    Social connections     Talks on phone: Not on file     Gets together: Not on file     Attends Denominational service: Not on file     Active member of club or organization: Not on file     Attends meetings of clubs or organizations: Not on file     Relationship status: Not on file    Intimate partner violence     Fear of current or ex partner: Not on file     Emotionally abused: Not on file     Physically abused: Not on file     Forced sexual activity: Not on file   Other Topics Concern    Not on file   Social History Narrative    Not on file       Family History:   Family History   Problem Relation Age of Onset    Hypotension Father     Heart Disease Brother        Current Hospital Medications:    Current Facility-Administered Medications: enoxaparin (LOVENOX) injection 30 mg, 30 mg, Subcutaneous, Daily  HYDROmorphone HCl PF (DILAUDID) injection 0.5 mg, 0.5 mg, Intravenous, Q4H PRN  oxyCODONE-acetaminophen (PERCOCET) 5-325 MG per tablet 2 tablet, 2 tablet, Oral, Q4H PRN  ipratropium-albuterol (DUONEB) nebulizer solution 1 ampule, 1 ampule, Inhalation, Q4H WA  sodium chloride flush 0.9 % injection 10 mL, 10 mL, Intravenous, 2 times per day  sodium chloride flush 0.9 % injection 10 mL, 10 mL, Intravenous, PRN  acetaminophen (TYLENOL) tablet 650 mg, 650 mg, Oral, Q4H PRN  albuterol (PROVENTIL) nebulizer solution 2.5 mg, 2.5 mg, Nebulization, Q4H PRN  aspirin EC tablet 81 mg, 81 mg, Oral, Daily  atorvastatin (LIPITOR) tablet 40 mg, 40 mg, Oral, Daily  cloNIDine (CATAPRES) tablet 0.1 mg, 0.1 mg, Oral, BID  clopidogrel (PLAVIX) tablet 75 mg, 75 mg, Oral, Daily  guaiFENesin (MUCINEX) extended release tablet 1,200 mg, 1,200 mg, Oral, BID  HYDROcodone-acetaminophen (NORCO)  MG per tablet 1 tablet, 1 tablet, Oral, Q6H PRN  lisinopril (PRINIVIL;ZESTRIL) tablet 20 mg, 20 mg, Oral, Daily  metoprolol succinate (TOPROL XL) extended release tablet 50 mg, 50 mg, Oral, Daily  pantoprazole (PROTONIX) tablet 40 mg, 40 mg, Oral, Daily  tamsulosin (FLOMAX) capsule 0.4 mg, 0.4 mg, Oral, Daily    Allergies:    Allergies   Allergen Reactions    Amoxicillin-Pot Clavulanate Nausea And Vomiting     Other reaction(s): stomach pain         Subjective   REVIEW OF SYSTEMS:   CONSTITUTIONAL: no fever, no night sweats, fatigue; weight loss  HEENT: no blurring of vision, no double vision, no hearing difficulty, no tinnitus, no ulceration, no dysplasia, no epistaxis;  LUNGS: no cough, no hemoptysis, no wheeze,  no shortness of breath;  CARDIOVASCULAR: no palpitation, chest pain, no shortness of breath;  GI: no abdominal pain, no nausea, no vomiting, no diarrhea, no constipation;  ADELINE: no dysuria, no hematuria, no frequency or urgency, no nephrolithiasis;  MUSCULOSKELETAL: no joint pain, no swelling, no hilar lymph node on image 89 measures 1.2 cm short axis dimension. Indeterminate 1.3 cm low-density LEFT liver lesion on image 136. 2.7 cm low-density RIGHT liver lesion on image 166, highly suspicious for liver neoplasm. Impression: 1. No evidence of pulmonary embolus. 2.  6 cm LEFT hilar/infrahilar soft tissue mass, favored to represent a primary lung neoplasm. There is invasion into the mediastinum with encasement and narrowing of the LEFT mainstem bronchus and near complete occlusion of the LEFT lower lobe bronchus with resultant near complete LEFT lower lobe collapse. 3.  Bulky mediastinal and LEFT supraclavicular adenopathy most consistent with alfredo spread of neoplasm. Indeterminate mildly enlarged RIGHT hilar lymph node. 4.  Multiple liver lesions which are highly suspicious for hepatic metastasis. 5.  Small indeterminate RIGHT lung pulmonary nodules. 6.  Severe emphysema. Signed by Dr Brianda Rose on 5/13/2020 6:25 PM      My interpretation:Mediastinal mass and liver mets    ASSESSMENT:  #Lung mass/mediastinal adenopathy- likely lung cancer. Patient has a left supraclavicular lymph node. Will attempt FNA biopsy of these lymph node. 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 If unsuccessful FNA biopsy left supraclavicular lymph node patient will need to hold Plavix/aspirin and attempt biopsy of left liver lesion. PLAN:  FNA biopsy left supraclavicular lymph node today  We'll arrange for follow-up in clinic  Palliative care consult    I have seen, examined and reviewed this patient medication list, appropriate labs and imaging studies. I reviewed relevant medical records and others physicians notes. I discussed the plans of care with the patient. I answered all the questions to the patients satisfaction.  I have also reviewed the chief complaint (CC) and part of the history (History of Present Illness (HPI), Past Family Social [facility]), or Review of Systems (ROS) and made changes when appropriated.        (Please note that portions of this note were completed with a voice recognition program. Efforts were made to edit the dictations but occasionally words are mis-transcribed.)    Carlos Wolfe MD    05/15/20  6:42 AM

## 2020-05-15 NOTE — CONSULTS
Pulmonary and Critical Care Consult Note  [facility] Kareem Wilson    MR# [id number]    Acct# [de-identified]  5/15/2020   10:19 AM CDT    Referring Estevan Stroud MD  Chief Complaint: Lung Mass     HPI: We have been consulted to see this 68y.o. year old male born on 1940. He presented to his PCP with complaints of left flank/ chest pain that would radiate to his back and was worse with exertion. He would have problems sleeping on the left side as it would ache. Work up included a CXR and CTA. CTA was negative for PE but did show a 6mm Left hilar/ infrahilar soft tissue mass with invasion into the mediastinum with encasement and narrowing of the left mainstem bronchus and near compete left lower lobe collapse. Multiple liver lesions, Mediastinal and left supraclavicular adenopathy. Small right lung nodules. He was admitted for pain and symptom control. He has known Dm, carotid artery disease, CAD/CABG, PVD, HTN, HLD, BP, COPD and arthritis. He was on albuterol inhaler at home and states he was diagnosed 15 years ago. No recent PFTs. He is on Plavix. He is a former smoker quitting in Oct 2019, prior smoked <1PPD since [employer]. He also notes he was told he had a 8 mm L lung nodule about 7 years ago that had serial imaging and after 3 years he states the nodule was either gone or stable. CT of head was negative. He states a couple of months ago he was 130 lbs and here he weighed 117 lbs. This has been unintentional weight loss. Pulmonology has been asked to see the patient for the lung mass and options.     Past Medical History      Past Medical History:   Diagnosis Date    Arteriosclerotic heart disease (ASHD)     Arthritis     BPH (benign prostatic hyperplasia)     Carotid artery stenosis     DJD (degenerative joint disease)     DM (diabetes mellitus), type 2 (HCC)     HTN (hypertension)     Hypercholesteremia     Hypercholesterolemia     Polyosteoarthritis     [facility]      SurgicalHistory  Past Surgical History:   Procedure Laterality Date    CARDIAC CATHETERIZATION  09/2005    CAROTID ENDARTERECTOMY      CATARACT REMOVAL WITH IMPLANT      CHOLECYSTECTOMY, LAPAROSCOPIC  04/22/2011    with oversew of preforated peptic ulcer with joel patch closure     CORONARY ARTERY BYPASS GRAFT       Allergies  Allergies   Allergen Reactions    Amoxicillin-Pot Clavulanate Nausea And Vomiting     Other reaction(s): stomach pain     Medications  enoxaparin, 30 mg, Subcutaneous, Daily    ipratropium-albuterol, 1 ampule, Inhalation, Q4H WA    sodium chloride flush, 10 mL, Intravenous, 2 times per day    aspirin, 81 mg, Oral, Daily    atorvastatin, 40 mg, Oral, Daily    cloNIDine, 0.1 mg, Oral, BID    clopidogrel, 75 mg, Oral, Daily    guaiFENesin, 1,200 mg, Oral, BID    lisinopril, 20 mg, Oral, Daily    metoprolol succinate, 50 mg, Oral, Daily    pantoprazole, 40 mg, Oral, Daily    tamsulosin, 0.4 mg, Oral, Daily   Social History   reports that he has been smoking. He has been smoking about 1.00 pack per day. He uses smokeless tobacco. He reports that he does not drink alcohol or use drugs. Family History  family history includes Heart Disease in his brother; Hypotension in his father. Review of Systems:  Review of Systems   Constitutional: Positive for fatigue. Negative for chills and unexpected weight change (weight loss 13 lbs in couple months ). HENT: Negative for congestion and sore throat. Eyes: Negative for discharge and itching. Respiratory: Positive for cough (only first thing in am ) and shortness of breath. Cardiovascular: Positive for chest pain (Flank pain). Negative for palpitations and leg swelling. Gastrointestinal: Negative for nausea and vomiting. Genitourinary: Negative for difficulty urinating and dysuria. Musculoskeletal: Negative for back pain and gait problem. Skin: Negative for rash and wound. Neurological: Negative for dizziness and syncope. Psychiatric/Behavioral: Negative for agitation and behavioral problems. Physical Exam:   height is 5' 8\" (1.727 m) and weight is 117 lb (53.1 kg). His temperature is 97.7 °F (36.5 °C). His blood pressure is 123/73 and his pulse is 79. His respiration is 16 and oxygen saturation is 93%. Intake/Output Summary (Last 24 hours) at 5/15/2020 1019  Last data filed at 5/15/2020 0859  Gross per 24 hour   Intake 960 ml   Output --   Net 960 ml     Physical Exam  Vitals signs and nursing note reviewed. Constitutional:       Appearance: He is underweight. HENT:      Head: Atraumatic. Nose: Nose normal. No congestion. Mouth/Throat:      Mouth: Mucous membranes are moist.   Eyes:      Conjunctiva/sclera: Conjunctivae normal.      Pupils: Pupils are equal, round, and reactive to light. Neck:      Musculoskeletal: Normal range of motion and neck supple. Cardiovascular:      Rate and Rhythm: Normal rate and regular rhythm. Heart sounds: No murmur. No friction rub. No gallop. Pulmonary:      Effort: Pulmonary effort is normal.      Breath sounds: Normal breath sounds. Abdominal:      General: Abdomen is flat. Bowel sounds are normal.   Musculoskeletal:         General: No swelling or deformity. Skin:     General: Skin is warm and dry. Neurological:      General: No focal deficit present. Mental Status: He is alert and oriented to person, place, and time. Psychiatric:         Mood and Affect: Mood normal.         Behavior: Behavior normal.         Thought Content:  Thought content normal.         Judgment: Judgment normal.       Recent Labs     05/13/20  1455   WBC 7.7   RBC 3.84*   HGB 12.1*   HCT 37.5*      MCV 97.7*   MCH 31.5*   MCHC 32.3*   RDW 13.5      Recent Labs     05/13/20  1455      K 4.6   CL 97*   CO2 26   BUN 20   CREATININE 1.8*   CALCIUM 9.2   GLUCOSE 132*        Recent Labs     05/13/20  1455 nodular opacities at the right lower lung measuring 2.1 and 1.9 cm. These could represent pulmonary nodules. Old rib fractures would also be in the differential. Sternotomy wires. Cardiac and mediastinal silhouette appear within normal limits. 1. Left lower lobe opacity and effusion. In the appropriate clinical setting, this would be supportive of infection. Recommend follow-up after treatment to evaluate for resolution. 2. There are 2 nodular opacities at the right lower lung, which could represent pulmonary nodules or old rib fractures. Consider nonemergent CT chest for further evaluation. Signed by Dr Clement Tompkins on 5/13/2020 3:46 PM    Cta Pulmonary W Contrast    Result Date: 5/13/2020  CTA PULMONARY W CONTRAST - 5/13/2020 2:30 PM Indication: Left-sided chest pain, shortness of air Comparison: 12/19/2017 DLP: 369 mGy cm. In order to have a CT radiation dose as low as reasonably achievable, Automated Exposure Control was utilized for adjustment of the mA and/or KV according to patient size. Findings: No pulmonary embolus identified. Main pulmonary artery is normal caliber. No evidence of RIGHT heart strain. Thoracic aorta is normal caliber and contains atherosclerotic calcification. Prior CABG. No pericardial effusion. 6 x 5 cm LEFT infrahilar infiltrative soft tissue mass involving the mediastinum and encasing and narrowing the LEFT mainstem bronchus. This mass is inseparable from the esophagus but the esophagus is nondilated and appears normal above and below this mass. There is near complete collapse of the LEFT lower lobe. Severe emphysema with biapical pleural/parenchymal scarring. 10 mm RIGHT lower lobe pulmonary nodule on image 49 in a background of scarring. 5 mm RIGHT lower lobe kaci-fissural pulmonary nodule on image 39. Small LEFT pleural effusion. No axillary adenopathy. Large LEFT supraclavicular lymph node on image 17 measures 2.4 x 3.9 cm.  Multiple enlarged mediastinal lymph nodes are history of present illness, physical examination, and assessment and plan to reflect my findings and impressions. Essential elements of the care plan were discussed with APRN above. Agree with findings and assessment/plan as documented above.     Electronically signed by Juan Francisco Leija on 5/15/2020, 11:52 AM

## 2020-05-18 ENCOUNTER — TELEPHONE (OUTPATIENT)
Dept: HEMATOLOGY | Age: 80
End: 2020-05-18

## 2020-05-18 NOTE — TELEPHONE ENCOUNTER
Called patient to let him know of his appointment for PET scan and follow up with Dr. Elaian Bee. He does not want to do any scans or follow up with Doctor Elaina Bee until  He talks to Dr. Riley Rome and Dr. Елена Santamaria.

## 2020-05-22 ENCOUNTER — HOSPITAL ENCOUNTER (OUTPATIENT)
Dept: NUCLEAR MEDICINE | Age: 80
Discharge: HOME | End: 2020-05-24
Payer: MEDICARE

## 2020-05-22 PROBLEM — R91.8 LUNG MASS: Status: ACTIVE | Noted: 2020-05-22

## 2020-05-22 LAB
GLUCOSE BLD-MCNC: 129 MG/DL (ref 70–99)
PERFORMED ON: ABNORMAL

## 2020-05-22 PROCEDURE — 3430000000 HC RX DIAGNOSTIC RADIOPHARMACEUTICAL: Performed by: INTERNAL MEDICINE

## 2020-05-22 PROCEDURE — 78815 PET IMAGE W/CT SKULL-THIGH: CPT

## 2020-05-22 PROCEDURE — 82947 ASSAY GLUCOSE BLOOD QUANT: CPT

## 2020-05-22 PROCEDURE — A9552 F18 FDG: HCPCS | Performed by: INTERNAL MEDICINE

## 2020-05-22 RX ORDER — FLUDEOXYGLUCOSE F 18 200 MCI/ML
10 INJECTION, SOLUTION INTRAVENOUS
Status: COMPLETED | OUTPATIENT
Start: 2020-05-22 | End: 2020-05-22

## 2020-05-22 RX ADMIN — FLUDEOXYGLUCOSE F 18 10 MILLICURIE: 200 INJECTION, SOLUTION INTRAVENOUS at 14:40

## 2020-05-26 NOTE — PROGRESS NOTES
Diagnosis Date    Arteriosclerotic heart disease (ASHD)     Arthritis     BPH (benign prostatic hyperplasia)     Cancer (HCC)     Carotid artery stenosis     DJD (degenerative joint disease)     DM (diabetes mellitus), type 2 (HCC)     HTN (hypertension)     Hypercholesteremia     Hypercholesterolemia     Polyosteoarthritis     PVD (peripheral vascular disease) (HCC)        Past Surgical History:    Past Surgical History:   Procedure Laterality Date    CARDIAC CATHETERIZATION  09/2005    CAROTID ENDARTERECTOMY      CATARACT REMOVAL WITH IMPLANT      CHOLECYSTECTOMY, LAPAROSCOPIC  04/22/2011    with oversew of preforated peptic ulcer with joel patch closure     CORONARY ARTERY BYPASS GRAFT         Social History:    Social History     Socioeconomic History    Marital status:       Spouse name: Not on file    Number of children: Not on file    Years of education: Not on file    Highest education level: Not on file   Occupational History    Not on file   Social Needs    Financial resource strain: Not on file    Food insecurity     Worry: Not on file     Inability: Not on file    Transportation needs     Medical: Not on file     Non-medical: Not on file   Tobacco Use    Smoking status: Current Every Day Smoker     Packs/day: 1.00    Smokeless tobacco: Current User   Substance and Sexual Activity    Alcohol use: Never     Frequency: Never    Drug use: Never    Sexual activity: Not on file   Lifestyle    Physical activity     Days per week: Not on file     Minutes per session: Not on file    Stress: Not on file   Relationships    Social connections     Talks on phone: Not on file     Gets together: Not on file     Attends Hindu service: Not on file     Active member of club or organization: Not on file     Attends meetings of clubs or organizations: Not on file     Relationship status: Not on file    Intimate partner violence     Fear of current or ex partner: Not on file MD    05/27/20  3:40 PM

## 2020-05-27 ENCOUNTER — OFFICE VISIT (OUTPATIENT)
Dept: HEMATOLOGY | Age: 80
End: 2020-05-27
Payer: MEDICARE

## 2020-05-27 ENCOUNTER — HOSPITAL ENCOUNTER (OUTPATIENT)
Dept: INFUSION THERAPY | Age: 80
Discharge: HOME | End: 2020-05-27
Payer: MEDICARE

## 2020-05-27 VITALS
DIASTOLIC BLOOD PRESSURE: 78 MMHG | TEMPERATURE: 98.5 F | HEART RATE: 74 BPM | WEIGHT: 118.1 LBS | SYSTOLIC BLOOD PRESSURE: 120 MMHG | OXYGEN SATURATION: 98 % | HEIGHT: 68 IN | BODY MASS INDEX: 17.9 KG/M2

## 2020-05-27 DIAGNOSIS — R91.8 LUNG MASS: ICD-10-CM

## 2020-05-27 LAB
BASOPHILS ABSOLUTE: 0.05 K/UL (ref 0.01–0.08)
BASOPHILS RELATIVE PERCENT: 0.4 % (ref 0.1–1.2)
EOSINOPHILS ABSOLUTE: 0.5 K/UL (ref 0.04–0.54)
EOSINOPHILS RELATIVE PERCENT: 4.5 % (ref 0.7–7)
HCT VFR BLD CALC: 41.9 % (ref 40.1–51)
HEMOGLOBIN: 13.5 G/DL (ref 13.7–17.5)
LYMPHOCYTES ABSOLUTE: 1.98 K/UL (ref 1.18–3.74)
LYMPHOCYTES RELATIVE PERCENT: 17.7 % (ref 19.3–53.1)
MCH RBC QN AUTO: 32.5 PG (ref 25.7–32.2)
MCHC RBC AUTO-ENTMCNC: 32.2 G/DL (ref 32.3–36.5)
MCV RBC AUTO: 101 FL (ref 79–92.2)
MONOCYTES ABSOLUTE: 1.03 K/UL (ref 0.24–0.82)
MONOCYTES RELATIVE PERCENT: 9.2 % (ref 4.7–12.5)
NEUTROPHILS ABSOLUTE: 7.63 K/UL (ref 1.56–6.13)
NEUTROPHILS RELATIVE PERCENT: 68.2 % (ref 34–71.1)
PDW BLD-RTO: 13.3 % (ref 11.6–14.4)
PLATELET # BLD: 259 K/UL (ref 163–337)
PMV BLD AUTO: 9.6 FL (ref 7.4–10.4)
RBC # BLD: 4.15 M/UL (ref 4.63–6.08)
WBC # BLD: 11.19 K/UL (ref 4.23–9.07)

## 2020-05-27 PROCEDURE — 99214 OFFICE O/P EST MOD 30 MIN: CPT | Performed by: INTERNAL MEDICINE

## 2020-05-27 PROCEDURE — G8427 DOCREV CUR MEDS BY ELIG CLIN: HCPCS | Performed by: INTERNAL MEDICINE

## 2020-05-27 PROCEDURE — 4004F PT TOBACCO SCREEN RCVD TLK: CPT | Performed by: INTERNAL MEDICINE

## 2020-05-27 PROCEDURE — 99212 OFFICE O/P EST SF 10 MIN: CPT

## 2020-05-27 PROCEDURE — 4040F PNEUMOC VAC/ADMIN/RCVD: CPT | Performed by: INTERNAL MEDICINE

## 2020-05-27 PROCEDURE — 85025 COMPLETE CBC W/AUTO DIFF WBC: CPT

## 2020-05-27 PROCEDURE — G8419 CALC BMI OUT NRM PARAM NOF/U: HCPCS | Performed by: INTERNAL MEDICINE

## 2020-05-27 PROCEDURE — 1123F ACP DISCUSS/DSCN MKR DOCD: CPT | Performed by: INTERNAL MEDICINE

## 2020-05-27 RX ORDER — MORPHINE SULFATE 15 MG/1
15 TABLET, FILM COATED, EXTENDED RELEASE ORAL EVERY 12 HOURS
Qty: 60 EACH | Refills: 0 | Status: SHIPPED | OUTPATIENT
Start: 2020-05-27 | End: 2020-06-26

## 2020-05-27 RX ORDER — OXYCODONE AND ACETAMINOPHEN 10; 325 MG/1; MG/1
1 TABLET ORAL EVERY 6 HOURS PRN
COMMUNITY
End: 2020-06-15 | Stop reason: ALTCHOICE

## 2020-05-28 ENCOUNTER — HOSPITAL ENCOUNTER (OUTPATIENT)
Dept: RADIATION ONCOLOGY | Facility: HOSPITAL | Age: 80
Setting detail: RADIATION/ONCOLOGY SERIES
End: 2020-05-28

## 2020-05-28 ENCOUNTER — OFFICE VISIT (OUTPATIENT)
Dept: OTOLARYNGOLOGY | Facility: CLINIC | Age: 80
End: 2020-05-28

## 2020-05-28 ENCOUNTER — TELEPHONE (OUTPATIENT)
Dept: PALLATIVE CARE | Age: 80
End: 2020-05-28

## 2020-05-28 VITALS
WEIGHT: 119 LBS | DIASTOLIC BLOOD PRESSURE: 57 MMHG | SYSTOLIC BLOOD PRESSURE: 112 MMHG | HEIGHT: 68 IN | TEMPERATURE: 96.9 F | HEART RATE: 56 BPM | BODY MASS INDEX: 18.04 KG/M2 | RESPIRATION RATE: 16 BRPM

## 2020-05-28 DIAGNOSIS — R91.8 LUNG MASS: ICD-10-CM

## 2020-05-28 DIAGNOSIS — R59.0 SUPRACLAVICULAR LYMPHADENOPATHY: Primary | ICD-10-CM

## 2020-05-28 PROBLEM — C34.90 LUNG CANCER (HCC): Status: ACTIVE | Noted: 2020-05-28

## 2020-05-28 PROCEDURE — 99203 OFFICE O/P NEW LOW 30 MIN: CPT | Performed by: PHYSICIAN ASSISTANT

## 2020-05-28 RX ORDER — ATORVASTATIN CALCIUM 40 MG/1
40 TABLET, FILM COATED ORAL DAILY
COMMUNITY

## 2020-05-28 RX ORDER — CLOPIDOGREL BISULFATE 75 MG/1
75 TABLET ORAL DAILY
COMMUNITY

## 2020-05-28 RX ORDER — ALBUTEROL SULFATE 90 UG/1
2 AEROSOL, METERED RESPIRATORY (INHALATION) 2 TIMES DAILY
COMMUNITY

## 2020-05-28 RX ORDER — LISINOPRIL 20 MG/1
20 TABLET ORAL DAILY
COMMUNITY

## 2020-05-28 RX ORDER — TAMSULOSIN HYDROCHLORIDE 0.4 MG/1
2 CAPSULE ORAL DAILY
COMMUNITY

## 2020-05-28 RX ORDER — CLONIDINE HYDROCHLORIDE 0.1 MG/1
0.1 TABLET ORAL DAILY
COMMUNITY

## 2020-05-28 RX ORDER — PANTOPRAZOLE SODIUM 40 MG/1
40 TABLET, DELAYED RELEASE ORAL DAILY
COMMUNITY

## 2020-05-28 RX ORDER — METOPROLOL SUCCINATE 50 MG/1
50 TABLET, EXTENDED RELEASE ORAL DAILY
COMMUNITY

## 2020-05-28 NOTE — PATIENT INSTRUCTIONS
LEFT SUPRACLAVICULAR LYMPH NODE BIOPSY: The risks, benefits, and alternatives of the procedure including but not limited to pain, numbness, nerve injury, scarring, bleeding, infection, persistent symptoms, and risks of the anesthesia were discussed full with the patient and questions were answered. No guarantees were made or implied.      Dr. Garcia examined the patient and agrees with assessment and plan.

## 2020-05-29 ENCOUNTER — CONSULT (OUTPATIENT)
Dept: RADIATION ONCOLOGY | Facility: HOSPITAL | Age: 80
End: 2020-05-29

## 2020-05-29 ENCOUNTER — APPOINTMENT (OUTPATIENT)
Dept: RADIATION ONCOLOGY | Facility: HOSPITAL | Age: 80
End: 2020-05-29

## 2020-05-29 ENCOUNTER — HOSPITAL ENCOUNTER (OUTPATIENT)
Dept: GENERAL RADIOLOGY | Facility: HOSPITAL | Age: 80
Discharge: HOME | End: 2020-05-29
Admitting: PHYSICIAN ASSISTANT

## 2020-05-29 ENCOUNTER — APPOINTMENT (OUTPATIENT)
Dept: LAB | Facility: HOSPITAL | Age: 80
End: 2020-05-29

## 2020-05-29 ENCOUNTER — APPOINTMENT (OUTPATIENT)
Dept: PREADMISSION TESTING | Facility: HOSPITAL | Age: 80
End: 2020-05-29

## 2020-05-29 ENCOUNTER — TRANSCRIBE ORDERS (OUTPATIENT)
Dept: ADMINISTRATIVE | Facility: HOSPITAL | Age: 80
End: 2020-05-29

## 2020-05-29 VITALS
SYSTOLIC BLOOD PRESSURE: 126 MMHG | DIASTOLIC BLOOD PRESSURE: 58 MMHG | BODY MASS INDEX: 18.38 KG/M2 | OXYGEN SATURATION: 92 % | HEART RATE: 62 BPM | HEIGHT: 68 IN | WEIGHT: 121.25 LBS

## 2020-05-29 VITALS
SYSTOLIC BLOOD PRESSURE: 118 MMHG | WEIGHT: 121 LBS | DIASTOLIC BLOOD PRESSURE: 59 MMHG | HEIGHT: 68 IN | BODY MASS INDEX: 18.34 KG/M2

## 2020-05-29 DIAGNOSIS — R59.0 SUPRACLAVICULAR LYMPHADENOPATHY: ICD-10-CM

## 2020-05-29 DIAGNOSIS — Z87.891 FORMER SMOKER: ICD-10-CM

## 2020-05-29 DIAGNOSIS — J44.0 CHRONIC OBSTRUCTIVE PULMONARY DISEASE WITH ACUTE LOWER RESPIRATORY INFECTION (HCC): ICD-10-CM

## 2020-05-29 DIAGNOSIS — C34.32 MALIGNANT NEOPLASM OF LOWER LOBE OF LEFT LUNG (HCC): Primary | ICD-10-CM

## 2020-05-29 DIAGNOSIS — C79.70: ICD-10-CM

## 2020-05-29 DIAGNOSIS — Z01.818 PREOP TESTING: Primary | ICD-10-CM

## 2020-05-29 PROBLEM — E78.5 DYSLIPIDEMIA: Status: ACTIVE | Noted: 2020-05-29

## 2020-05-29 PROBLEM — K59.00 CONSTIPATION: Status: ACTIVE | Noted: 2020-05-29

## 2020-05-29 PROBLEM — R07.9 CHEST PAIN: Status: ACTIVE | Noted: 2020-05-29

## 2020-05-29 LAB
ALBUMIN SERPL-MCNC: 4.1 G/DL (ref 3.5–5.2)
ALBUMIN/GLOB SERPL: 1.4 G/DL
ALP SERPL-CCNC: 85 U/L (ref 39–117)
ALT SERPL W P-5'-P-CCNC: 10 U/L (ref 1–41)
ANION GAP SERPL CALCULATED.3IONS-SCNC: 12 MMOL/L (ref 5–15)
AST SERPL-CCNC: 17 U/L (ref 1–40)
BASOPHILS # BLD AUTO: 0.05 10*3/MM3 (ref 0–0.2)
BASOPHILS NFR BLD AUTO: 0.6 % (ref 0–1.5)
BILIRUB SERPL-MCNC: 0.3 MG/DL (ref 0.2–1.2)
BUN BLD-MCNC: 19 MG/DL (ref 8–23)
BUN/CREAT SERPL: 12.9 (ref 7–25)
CALCIUM SPEC-SCNC: 9.3 MG/DL (ref 8.6–10.5)
CHLORIDE SERPL-SCNC: 96 MMOL/L (ref 98–107)
CO2 SERPL-SCNC: 28 MMOL/L (ref 22–29)
CREAT BLD-MCNC: 1.47 MG/DL (ref 0.76–1.27)
DEPRECATED RDW RBC AUTO: 48.1 FL (ref 37–54)
EOSINOPHIL # BLD AUTO: 0.38 10*3/MM3 (ref 0–0.4)
EOSINOPHIL NFR BLD AUTO: 4.7 % (ref 0.3–6.2)
ERYTHROCYTE [DISTWIDTH] IN BLOOD BY AUTOMATED COUNT: 13.6 % (ref 12.3–15.4)
GFR SERPL CREATININE-BSD FRML MDRD: 46 ML/MIN/1.73
GLOBULIN UR ELPH-MCNC: 2.9 GM/DL
GLUCOSE BLD-MCNC: 178 MG/DL (ref 65–99)
HCT VFR BLD AUTO: 36.5 % (ref 37.5–51)
HGB BLD-MCNC: 12 G/DL (ref 13–17.7)
IMM GRANULOCYTES # BLD AUTO: 0.03 10*3/MM3 (ref 0–0.05)
IMM GRANULOCYTES NFR BLD AUTO: 0.4 % (ref 0–0.5)
LYMPHOCYTES # BLD AUTO: 1.27 10*3/MM3 (ref 0.7–3.1)
LYMPHOCYTES NFR BLD AUTO: 15.6 % (ref 19.6–45.3)
MCH RBC QN AUTO: 31.7 PG (ref 26.6–33)
MCHC RBC AUTO-ENTMCNC: 32.9 G/DL (ref 31.5–35.7)
MCV RBC AUTO: 96.3 FL (ref 79–97)
MONOCYTES # BLD AUTO: 0.66 10*3/MM3 (ref 0.1–0.9)
MONOCYTES NFR BLD AUTO: 8.1 % (ref 5–12)
NEUTROPHILS # BLD AUTO: 5.75 10*3/MM3 (ref 1.7–7)
NEUTROPHILS NFR BLD AUTO: 70.6 % (ref 42.7–76)
NRBC BLD AUTO-RTO: 0 /100 WBC (ref 0–0.2)
PLATELET # BLD AUTO: 276 10*3/MM3 (ref 140–450)
PMV BLD AUTO: 10 FL (ref 6–12)
POTASSIUM BLD-SCNC: 5.2 MMOL/L (ref 3.5–5.2)
PROT SERPL-MCNC: 7 G/DL (ref 6–8.5)
RBC # BLD AUTO: 3.79 10*6/MM3 (ref 4.14–5.8)
SODIUM BLD-SCNC: 136 MMOL/L (ref 136–145)
WBC NRBC COR # BLD: 8.14 10*3/MM3 (ref 3.4–10.8)

## 2020-05-29 PROCEDURE — 77334 RADIATION TREATMENT AID(S): CPT | Performed by: RADIOLOGY

## 2020-05-29 PROCEDURE — C9803 HOPD COVID-19 SPEC COLLECT: HCPCS

## 2020-05-29 PROCEDURE — 77290 THER RAD SIMULAJ FIELD CPLX: CPT | Performed by: RADIOLOGY

## 2020-05-29 PROCEDURE — 36415 COLL VENOUS BLD VENIPUNCTURE: CPT

## 2020-05-29 PROCEDURE — 93010 ELECTROCARDIOGRAM REPORT: CPT | Performed by: INTERNAL MEDICINE

## 2020-05-29 PROCEDURE — 93005 ELECTROCARDIOGRAM TRACING: CPT

## 2020-05-29 PROCEDURE — 85025 COMPLETE CBC W/AUTO DIFF WBC: CPT | Performed by: PHYSICIAN ASSISTANT

## 2020-05-29 PROCEDURE — 80053 COMPREHEN METABOLIC PANEL: CPT | Performed by: PHYSICIAN ASSISTANT

## 2020-05-29 PROCEDURE — U0003 INFECTIOUS AGENT DETECTION BY NUCLEIC ACID (DNA OR RNA); SEVERE ACUTE RESPIRATORY SYNDROME CORONAVIRUS 2 (SARS-COV-2) (CORONAVIRUS DISEASE [COVID-19]), AMPLIFIED PROBE TECHNIQUE, MAKING USE OF HIGH THROUGHPUT TECHNOLOGIES AS DESCRIBED BY CMS-2020-01-R: HCPCS | Performed by: OTOLARYNGOLOGY

## 2020-05-29 PROCEDURE — 71046 X-RAY EXAM CHEST 2 VIEWS: CPT

## 2020-05-29 RX ORDER — OXYCODONE AND ACETAMINOPHEN 10; 325 MG/1; MG/1
1 TABLET ORAL EVERY 6 HOURS PRN
COMMUNITY
End: 2020-06-12

## 2020-05-29 RX ORDER — GUAIFENESIN 600 MG/1
1200 TABLET, EXTENDED RELEASE ORAL 2 TIMES DAILY
COMMUNITY

## 2020-05-30 LAB
COVID LABCORP PRIORITY: NORMAL
SARS-COV-2 RNA RESP QL NAA+PROBE: NOT DETECTED

## 2020-06-01 ENCOUNTER — ANESTHESIA (OUTPATIENT)
Dept: PERIOP | Facility: HOSPITAL | Age: 80
End: 2020-06-01

## 2020-06-01 ENCOUNTER — HOSPITAL ENCOUNTER (OUTPATIENT)
Dept: RADIATION ONCOLOGY | Facility: HOSPITAL | Age: 80
Setting detail: RADIATION/ONCOLOGY SERIES
End: 2020-06-01

## 2020-06-01 ENCOUNTER — TELEPHONE (OUTPATIENT)
Dept: PALLATIVE CARE | Age: 80
End: 2020-06-01

## 2020-06-01 ENCOUNTER — ANESTHESIA EVENT (OUTPATIENT)
Dept: PERIOP | Facility: HOSPITAL | Age: 80
End: 2020-06-01

## 2020-06-01 ENCOUNTER — HOSPITAL ENCOUNTER (OUTPATIENT)
Facility: HOSPITAL | Age: 80
Setting detail: HOSPITAL OUTPATIENT SURGERY
Discharge: HOME | End: 2020-06-01
Attending: OTOLARYNGOLOGY | Admitting: OTOLARYNGOLOGY

## 2020-06-01 VITALS
SYSTOLIC BLOOD PRESSURE: 161 MMHG | RESPIRATION RATE: 16 BRPM | HEART RATE: 52 BPM | DIASTOLIC BLOOD PRESSURE: 76 MMHG | TEMPERATURE: 97.4 F | OXYGEN SATURATION: 96 %

## 2020-06-01 DIAGNOSIS — R59.0 SUPRACLAVICULAR LYMPHADENOPATHY: Primary | ICD-10-CM

## 2020-06-01 DIAGNOSIS — R91.8 LUNG MASS: ICD-10-CM

## 2020-06-01 LAB
GLUCOSE BLDC GLUCOMTR-MCNC: 122 MG/DL (ref 70–130)
GLUCOSE BLDC GLUCOMTR-MCNC: 157 MG/DL (ref 70–130)

## 2020-06-01 PROCEDURE — 25010000002 PROPOFOL 10 MG/ML EMULSION: Performed by: NURSE ANESTHETIST, CERTIFIED REGISTERED

## 2020-06-01 PROCEDURE — 38510 BIOPSY/REMOVAL LYMPH NODES: CPT | Performed by: OTOLARYNGOLOGY

## 2020-06-01 PROCEDURE — 25010000002 ONDANSETRON PER 1 MG: Performed by: NURSE ANESTHETIST, CERTIFIED REGISTERED

## 2020-06-01 PROCEDURE — 88341 IMHCHEM/IMCYTCHM EA ADD ANTB: CPT | Performed by: OTOLARYNGOLOGY

## 2020-06-01 PROCEDURE — 88342 IMHCHEM/IMCYTCHM 1ST ANTB: CPT | Performed by: OTOLARYNGOLOGY

## 2020-06-01 PROCEDURE — 25010000002 FENTANYL CITRATE (PF) 100 MCG/2ML SOLUTION: Performed by: NURSE ANESTHETIST, CERTIFIED REGISTERED

## 2020-06-01 PROCEDURE — 88305 TISSUE EXAM BY PATHOLOGIST: CPT | Performed by: OTOLARYNGOLOGY

## 2020-06-01 PROCEDURE — 25010000002 DEXAMETHASONE PER 1 MG: Performed by: NURSE ANESTHETIST, CERTIFIED REGISTERED

## 2020-06-01 PROCEDURE — 82962 GLUCOSE BLOOD TEST: CPT

## 2020-06-01 DEVICE — SEAL HEMO SURG ARISTA/AH ABS/PWDR 3GM: Type: IMPLANTABLE DEVICE | Status: FUNCTIONAL

## 2020-06-01 RX ORDER — ONDANSETRON 4 MG/1
4 TABLET, FILM COATED ORAL ONCE AS NEEDED
Status: DISCONTINUED | OUTPATIENT
Start: 2020-06-01 | End: 2020-06-01 | Stop reason: HOSPADM

## 2020-06-01 RX ORDER — OXYCODONE AND ACETAMINOPHEN 7.5; 325 MG/1; MG/1
2 TABLET ORAL EVERY 4 HOURS PRN
Status: CANCELLED | OUTPATIENT
Start: 2020-06-01 | End: 2020-06-11

## 2020-06-01 RX ORDER — HYDROCODONE BITARTRATE AND ACETAMINOPHEN 5; 325 MG/1; MG/1
1 TABLET ORAL ONCE AS NEEDED
Status: DISCONTINUED | OUTPATIENT
Start: 2020-06-01 | End: 2020-06-01 | Stop reason: HOSPADM

## 2020-06-01 RX ORDER — LIDOCAINE HYDROCHLORIDE 10 MG/ML
0.5 INJECTION, SOLUTION EPIDURAL; INFILTRATION; INTRACAUDAL; PERINEURAL ONCE AS NEEDED
Status: DISCONTINUED | OUTPATIENT
Start: 2020-06-01 | End: 2020-06-01 | Stop reason: HOSPADM

## 2020-06-01 RX ORDER — SODIUM CHLORIDE, SODIUM LACTATE, POTASSIUM CHLORIDE, CALCIUM CHLORIDE 600; 310; 30; 20 MG/100ML; MG/100ML; MG/100ML; MG/100ML
1000 INJECTION, SOLUTION INTRAVENOUS CONTINUOUS
Status: DISCONTINUED | OUTPATIENT
Start: 2020-06-01 | End: 2020-06-01 | Stop reason: HOSPADM

## 2020-06-01 RX ORDER — ONDANSETRON 2 MG/ML
4 INJECTION INTRAMUSCULAR; INTRAVENOUS ONCE AS NEEDED
Status: CANCELLED | OUTPATIENT
Start: 2020-06-01

## 2020-06-01 RX ORDER — MAGNESIUM HYDROXIDE 1200 MG/15ML
LIQUID ORAL AS NEEDED
Status: DISCONTINUED | OUTPATIENT
Start: 2020-06-01 | End: 2020-06-01 | Stop reason: HOSPADM

## 2020-06-01 RX ORDER — LIDOCAINE HYDROCHLORIDE AND EPINEPHRINE 10; 10 MG/ML; UG/ML
INJECTION, SOLUTION INFILTRATION; PERINEURAL AS NEEDED
Status: DISCONTINUED | OUTPATIENT
Start: 2020-06-01 | End: 2020-06-01 | Stop reason: HOSPADM

## 2020-06-01 RX ORDER — ONDANSETRON 2 MG/ML
INJECTION INTRAMUSCULAR; INTRAVENOUS AS NEEDED
Status: DISCONTINUED | OUTPATIENT
Start: 2020-06-01 | End: 2020-06-01 | Stop reason: SURG

## 2020-06-01 RX ORDER — LABETALOL HYDROCHLORIDE 5 MG/ML
5 INJECTION, SOLUTION INTRAVENOUS
Status: CANCELLED | OUTPATIENT
Start: 2020-06-01

## 2020-06-01 RX ORDER — FENTANYL CITRATE 50 UG/ML
INJECTION, SOLUTION INTRAMUSCULAR; INTRAVENOUS AS NEEDED
Status: DISCONTINUED | OUTPATIENT
Start: 2020-06-01 | End: 2020-06-01 | Stop reason: SURG

## 2020-06-01 RX ORDER — SODIUM CHLORIDE 0.9 % (FLUSH) 0.9 %
3 SYRINGE (ML) INJECTION EVERY 12 HOURS SCHEDULED
Status: DISCONTINUED | OUTPATIENT
Start: 2020-06-01 | End: 2020-06-01 | Stop reason: HOSPADM

## 2020-06-01 RX ORDER — FENTANYL CITRATE 50 UG/ML
25 INJECTION, SOLUTION INTRAMUSCULAR; INTRAVENOUS
Status: DISCONTINUED | OUTPATIENT
Start: 2020-06-01 | End: 2020-06-01 | Stop reason: HOSPADM

## 2020-06-01 RX ORDER — FLUMAZENIL 0.1 MG/ML
0.2 INJECTION INTRAVENOUS AS NEEDED
Status: CANCELLED | OUTPATIENT
Start: 2020-06-01

## 2020-06-01 RX ORDER — IBUPROFEN 600 MG/1
600 TABLET ORAL ONCE AS NEEDED
Status: CANCELLED | OUTPATIENT
Start: 2020-06-01

## 2020-06-01 RX ORDER — PROPOFOL 10 MG/ML
VIAL (ML) INTRAVENOUS AS NEEDED
Status: DISCONTINUED | OUTPATIENT
Start: 2020-06-01 | End: 2020-06-01 | Stop reason: SURG

## 2020-06-01 RX ORDER — HYDROCODONE BITARTRATE AND ACETAMINOPHEN 5; 325 MG/1; MG/1
1 TABLET ORAL EVERY 4 HOURS PRN
Qty: 8 TABLET | Refills: 0 | Status: SHIPPED | OUTPATIENT
Start: 2020-06-01 | End: 2020-06-12

## 2020-06-01 RX ORDER — OXYCODONE AND ACETAMINOPHEN 10; 325 MG/1; MG/1
1 TABLET ORAL ONCE AS NEEDED
Status: CANCELLED | OUTPATIENT
Start: 2020-06-01

## 2020-06-01 RX ORDER — SODIUM CHLORIDE 0.9 % (FLUSH) 0.9 %
3-10 SYRINGE (ML) INJECTION AS NEEDED
Status: DISCONTINUED | OUTPATIENT
Start: 2020-06-01 | End: 2020-06-01 | Stop reason: HOSPADM

## 2020-06-01 RX ORDER — NALOXONE HCL 0.4 MG/ML
0.4 VIAL (ML) INJECTION AS NEEDED
Status: CANCELLED | OUTPATIENT
Start: 2020-06-01

## 2020-06-01 RX ORDER — SODIUM CHLORIDE, SODIUM LACTATE, POTASSIUM CHLORIDE, CALCIUM CHLORIDE 600; 310; 30; 20 MG/100ML; MG/100ML; MG/100ML; MG/100ML
100 INJECTION, SOLUTION INTRAVENOUS CONTINUOUS
Status: DISCONTINUED | OUTPATIENT
Start: 2020-06-01 | End: 2020-06-01 | Stop reason: HOSPADM

## 2020-06-01 RX ORDER — SODIUM CHLORIDE 0.9 % (FLUSH) 0.9 %
3 SYRINGE (ML) INJECTION AS NEEDED
Status: DISCONTINUED | OUTPATIENT
Start: 2020-06-01 | End: 2020-06-01 | Stop reason: HOSPADM

## 2020-06-01 RX ORDER — DEXAMETHASONE SODIUM PHOSPHATE 4 MG/ML
INJECTION, SOLUTION INTRA-ARTICULAR; INTRALESIONAL; INTRAMUSCULAR; INTRAVENOUS; SOFT TISSUE AS NEEDED
Status: DISCONTINUED | OUTPATIENT
Start: 2020-06-01 | End: 2020-06-01 | Stop reason: SURG

## 2020-06-01 RX ORDER — FENTANYL CITRATE 50 UG/ML
25 INJECTION, SOLUTION INTRAMUSCULAR; INTRAVENOUS
Status: CANCELLED | OUTPATIENT
Start: 2020-06-01

## 2020-06-01 RX ADMIN — FENTANYL CITRATE 25 MCG: 50 INJECTION, SOLUTION INTRAMUSCULAR; INTRAVENOUS at 12:42

## 2020-06-01 RX ADMIN — ONDANSETRON HYDROCHLORIDE 4 MG: 2 SOLUTION INTRAMUSCULAR; INTRAVENOUS at 13:51

## 2020-06-01 RX ADMIN — DEXAMETHASONE SODIUM PHOSPHATE 4 MG: 4 INJECTION, SOLUTION INTRAMUSCULAR; INTRAVENOUS at 12:43

## 2020-06-01 RX ADMIN — FENTANYL CITRATE 25 MCG: 50 INJECTION, SOLUTION INTRAMUSCULAR; INTRAVENOUS at 13:23

## 2020-06-01 RX ADMIN — SODIUM CHLORIDE, POTASSIUM CHLORIDE, SODIUM LACTATE AND CALCIUM CHLORIDE: 600; 310; 30; 20 INJECTION, SOLUTION INTRAVENOUS at 13:13

## 2020-06-01 RX ADMIN — PROPOFOL 150 MG: 10 INJECTION, EMULSION INTRAVENOUS at 12:33

## 2020-06-01 RX ADMIN — FENTANYL CITRATE 25 MCG: 50 INJECTION, SOLUTION INTRAMUSCULAR; INTRAVENOUS at 12:59

## 2020-06-01 RX ADMIN — LIDOCAINE HYDROCHLORIDE 100 MG: 20 INJECTION, SOLUTION INTRAVENOUS at 12:33

## 2020-06-01 RX ADMIN — HYDROCODONE BITARTRATE AND ACETAMINOPHEN 1 TABLET: 5; 325 TABLET ORAL at 15:43

## 2020-06-01 RX ADMIN — SODIUM CHLORIDE, POTASSIUM CHLORIDE, SODIUM LACTATE AND CALCIUM CHLORIDE 1000 ML: 600; 310; 30; 20 INJECTION, SOLUTION INTRAVENOUS at 09:18

## 2020-06-01 RX ADMIN — FENTANYL CITRATE 25 MCG: 50 INJECTION, SOLUTION INTRAMUSCULAR; INTRAVENOUS at 13:30

## 2020-06-01 NOTE — ANESTHESIA POSTPROCEDURE EVALUATION
Patient: Adebayo Ruiz    Procedure Summary     Date:  06/01/20 Room / Location:  [facility] OR  /  [facility] OR    Anesthesia Start:  1230 Anesthesia Stop:  1414    Procedure:  LEFT SUPRACLAVICULAR LYMPH NODE BIOPSY/EXCISION (Left Neck) Diagnosis:       Supraclavicular lymphadenopathy      (Supraclavicular lymphadenopathy [R59.0])    Surgeon:  William Garcia MD Provider:  Ifeoma Bardales CRNA    Anesthesia Type:  MAC ASA Status:  3          Anesthesia Type: MAC    Vitals  Vitals Value Taken Time   /61 6/1/2020  2:40 PM   Temp 97.4 °F (36.3 °C) 6/1/2020  2:40 PM   Pulse 55 6/1/2020  2:42 PM   Resp 11 6/1/2020  2:40 PM   SpO2 97 % 6/1/2020  2:42 PM   Vitals shown include unvalidated device data.        Post Anesthesia Care and Evaluation    Patient location during evaluation: PACU  Patient participation: complete - patient participated  Level of consciousness: awake and alert  Pain management: adequate  Airway patency: patent  Anesthetic complications: No anesthetic complications  PONV Status: none  Cardiovascular status: acceptable and hemodynamically stable  Respiratory status: acceptable  Hydration status: acceptable    Comments: Blood pressure 166/80, pulse 51, temperature 97.4 °F (36.3 °C), temperature source Temporal, resp. rate 16, SpO2 94 %.    Patient discharged from PACU based upon Garett score. Please see RN notes for further details

## 2020-06-01 NOTE — ANESTHESIA PREPROCEDURE EVALUATION
Anesthesia Evaluation     Patient summary reviewed and Nursing notes reviewed   no history of anesthetic complications:  NPO Solid Status: > 8 hours  NPO Liquid Status: > 8 hours           Airway   Mallampati: I  TM distance: >3 FB  Neck ROM: full  No difficulty expected  Dental      Pulmonary    (+) a smoker Former, lung cancer (in remission), COPD,     ROS comment: Presented to ED at [facility] with chest pain. CT chest: Impression:  1.  No evidence of pulmonary embolus.  2.  6 cm LEFT hilar/infrahilar soft tissue mass, favored to represent  a primary lung neoplasm. There is invasion into the mediastinum with  encasement and narrowing of the LEFT mainstem bronchus and near  complete occlusion of the LEFT lower lobe bronchus with resultant near  complete LEFT lower lobe collapse.  3.  Bulky mediastinal and LEFT supraclavicular adenopathy most  consistent with mark spread of neoplasm. Indeterminate mildly  enlarged RIGHT hilar lymph node.  4.  Multiple liver lesions which are highly suspicious for hepatic  metastasis.  5.  Small indeterminate RIGHT lung pulmonary nodules.  6.  Severe emphysema.  Cardiovascular   Exercise tolerance: poor (<4 METS)    Patient on routine beta blocker and Beta blocker given within 24 hours of surgery    (+) hypertension, CABG (2003), hyperlipidemia,       Neuro/Psych  (-) seizures, TIA, CVA  GI/Hepatic/Renal/Endo    (+)  GERD,  renal disease CRI, diabetes mellitus type 2,   (-) liver disease    Musculoskeletal     Abdominal    Substance History      OB/GYN          Other      history of cancer (metastatic cancer, unclear source, having lymph node biopsy to figure out) active                    Anesthesia Plan    ASA 3     MAC     intravenous induction     Anesthetic plan, all risks, benefits, and alternatives have been provided, discussed and informed consent has been obtained with: patient.

## 2020-06-01 NOTE — OP NOTE
OPERATIVE NOTE  6/1/2020    NAME: Adebayo Ruiz    YOB: 1940  MRN: [id number]    PRE-OPERATIVE DIAGNOSIS:    Supraclavicular lymphadenopathy [R59.0]    POST-OPERATIVE DIAGNOSIS:   Post-Op Diagnosis Codes:     * Supraclavicular lymphadenopathy [R59.0]    PROCEDURE PERFORMED:   Excisional biopsy left supraclavicular lymph node    SURGEON:   William Garcia MD    ASSISTANT(S):   None    ANESTHESIA:   General Anesthesia via Laryngeal Airway    INDICATIONS: The patient is a 80 y.o. male with Supraclavicular lymphadenopathy [R59.0]    PROCEDURE:  The patient was brought to the operating room, given General Anesthesia via Laryngeal Airway, and prepped and draped in the usual manner.       Approximately 8 mL of 1% Xylocaine with epinephrine was injected subcutaneously under planned excision site the left level IV/V neck.  The incision was oriented horizontally made with #15 blade carried the incision down through the superficial layer of deep cervical fascia.  Minimal bleeding was encountered which was controlled with a combination of electrocautery, 3-0 silk ties and the [employer] Harmonic scalpel.  Circumferential dissection was accomplished of the level IV lymph node and it was carefully dissected from the internal jugular vein and surrounding fascial attachments.  Subsequently, noting that the node was with the posterior lateral aspect of the jugular vein a small rent was created in the vein.  The posterior aspect of the node was amputated utilizing #15 blade and submitted for permanent pathologic examination.  A small Satinsky clamp was utilized to control the jugular vein which was oversewn with a running lock stitch of 4-0 Prolene.  No further bleeding was noted. Communication was made with the pathology department regarding treatment of the specimen.  The wound was irrigated with copious amounts of normal saline solution and Shay placed over the jugular vein.  The incision was reapproximated  utilizing interrupted 4-0 Monocryl subcutaneously and interrupted 5-0 nylon to reapproximate the epidermis.  Bactroban ointment, Steri-Strips, Mastisol and sterile dressing were applied.  The procedure was terminated.     The patient tolerated the procedure well without complications and was transported to the postanesthesia care unit in stable condition.    SPECIMENS:  A: Level IV/V lymph node in fragments    COMPLICATIONS: NONE    ESTIMATED BLOOD LOSS:  < 25 cc    William Garcia MD  6/1/2020

## 2020-06-01 NOTE — ANESTHESIA PROCEDURE NOTES
Airway  Urgency: elective    Date/Time: 6/1/2020 12:36 PM  Airway not difficult    General Information and Staff    Patient location during procedure: OR  CRNA: Ifeoma Bardales CRNA    Indications and Patient Condition  Indications for airway management: airway protection    Preoxygenated: yes  Mask difficulty assessment: 0 - not attempted    Final Airway Details  Final airway type: supraglottic airway      Successful airway: I-gel  Size 4    Number of attempts at approach: 1  Assessment: lips, teeth, and gum same as pre-op

## 2020-06-01 NOTE — DISCHARGE INSTRUCTIONS

## 2020-06-02 PROBLEM — D49.1 LUNG NEOPLASM: Status: ACTIVE | Noted: 2020-05-28

## 2020-06-02 PROBLEM — C34.32 MALIGNANT NEOPLASM OF LOWER LOBE OF LEFT LUNG (HCC): Status: ACTIVE | Noted: 2020-05-28

## 2020-06-02 PROCEDURE — 77334 RADIATION TREATMENT AID(S): CPT | Performed by: RADIOLOGY

## 2020-06-02 PROCEDURE — 77295 3-D RADIOTHERAPY PLAN: CPT | Performed by: RADIOLOGY

## 2020-06-02 PROCEDURE — 77300 RADIATION THERAPY DOSE PLAN: CPT | Performed by: RADIOLOGY

## 2020-06-03 ENCOUNTER — DOCUMENTATION (OUTPATIENT)
Dept: RADIATION ONCOLOGY | Facility: HOSPITAL | Age: 80
End: 2020-06-03

## 2020-06-03 ENCOUNTER — HOSPITAL ENCOUNTER (OUTPATIENT)
Dept: RADIATION ONCOLOGY | Facility: HOSPITAL | Age: 80
Setting detail: RADIATION/ONCOLOGY SERIES
Discharge: HOME | End: 2020-06-03

## 2020-06-03 LAB
CYTO UR: NORMAL
LAB AP CASE REPORT: NORMAL
PATH REPORT.FINAL DX SPEC: NORMAL
PATH REPORT.GROSS SPEC: NORMAL

## 2020-06-03 PROCEDURE — 77412 RADIATION TX DELIVERY LVL 3: CPT | Performed by: RADIOLOGY

## 2020-06-03 PROCEDURE — 77280 THER RAD SIMULAJ FIELD SMPL: CPT | Performed by: RADIOLOGY

## 2020-06-03 NOTE — PROGRESS NOTES
SANDRA meet with Mr. Fiore due to distress score. He is a 80 year old male. He has stage four lung cancer and is receiving palliative radiation. Mr. Fiore states his biggest stressor is pain. He is hoping radiation will help. He also states his pain medication does assist with controlling his pain. At this time he does not have any trouble sleeping. He lives alone. His main support is his daughter and son in law. He does have another son but he lives in [place]. He plans to drive himself to treatment. He said when he feels well he enjoys working on cars and motorcycles. He was in a pleasant mood and states he does not need any resources or assistance. Mr. Fiore will contact SANDRA if assistance is needed in the future.

## 2020-06-04 ENCOUNTER — TELEPHONE (OUTPATIENT)
Dept: OTOLARYNGOLOGY | Facility: CLINIC | Age: 80
End: 2020-06-04

## 2020-06-04 ENCOUNTER — HOSPITAL ENCOUNTER (OUTPATIENT)
Dept: RADIATION ONCOLOGY | Facility: HOSPITAL | Age: 80
Setting detail: RADIATION/ONCOLOGY SERIES
Discharge: HOME | End: 2020-06-04

## 2020-06-04 PROCEDURE — 77412 RADIATION TX DELIVERY LVL 3: CPT | Performed by: RADIOLOGY

## 2020-06-04 NOTE — TELEPHONE ENCOUNTER
----- Message from William Garcia MD sent at 6/4/2020  8:22 AM CDT -----  Please call the patient regarding his abnormal result.  Make sure oncology is aware of the report and has follow-up.  Thanks

## 2020-06-04 NOTE — TELEPHONE ENCOUNTER
Patient notified. Patient has a appt with Dr. Vazquez on 6/10 per Gianna Burdick RN. She will notify Dr. Vazquez

## 2020-06-08 ENCOUNTER — HOSPITAL ENCOUNTER (OUTPATIENT)
Dept: RADIATION ONCOLOGY | Facility: HOSPITAL | Age: 80
Setting detail: RADIATION/ONCOLOGY SERIES
Discharge: HOME | End: 2020-06-08

## 2020-06-08 PROCEDURE — 77412 RADIATION TX DELIVERY LVL 3: CPT | Performed by: RADIOLOGY

## 2020-06-09 ENCOUNTER — HOSPITAL ENCOUNTER (OUTPATIENT)
Dept: CT IMAGING | Age: 80
Discharge: HOME | End: 2020-06-09
Payer: MEDICARE

## 2020-06-09 ENCOUNTER — HOSPITAL ENCOUNTER (OUTPATIENT)
Dept: RADIATION ONCOLOGY | Facility: HOSPITAL | Age: 80
Setting detail: RADIATION/ONCOLOGY SERIES
Discharge: HOME | End: 2020-06-09

## 2020-06-09 PROCEDURE — 6360000004 HC RX CONTRAST MEDICATION: Performed by: FAMILY MEDICINE

## 2020-06-09 PROCEDURE — 77412 RADIATION TX DELIVERY LVL 3: CPT | Performed by: RADIOLOGY

## 2020-06-09 PROCEDURE — 70492 CT SFT TSUE NCK W/O & W/DYE: CPT

## 2020-06-09 RX ADMIN — IOPAMIDOL 75 ML: 755 INJECTION, SOLUTION INTRAVENOUS at 10:49

## 2020-06-09 NOTE — PROGRESS NOTES
embolus. 6 cm left hilar, infrahilar soft tissue mass favored to represent a primary lung neoplasm. Invasion into the mediastinum with encasement and narrowing of the left mainstem bronchus and near complete occlusion of the left lower lobe bronchus with resulting near complete left lower lobe collapse. Bulky mediastinal and left supraclavicular adenopathy most consistent with alfredo spread of neoplasm. In the terminated mild enlarged right hilar lymph node. Multiple liver lesions which are highly suspicious for hepatic metastasis. Small indeterminate right lung pulmonary nodules. Severe emphysema. · 5/15/2020- he was first seen by me. Recommended FNA biopsy of left supraclavicular lymph node. · 5/13/2020 CT PE protocol showed  No evidence of pulmonary embolus. 6 cm LEFT hilar/infrahilar soft tissue mass, favored to represent a primary lung neoplasm. There is invasion into the mediastinum with encasement and narrowing of the LEFT mainstem bronchus and near complete occlusion of the LEFT lower lobe bronchus with resultant near complete LEFT lower lobe collapse. Bulky mediastinal and LEFT supraclavicular adenopathy most consistent with alfredo spread of neoplasm. Indeterminate mildly enlarged RIGHT hilar lymph node. Multiple liver lesions which are highly suspicious for hepatic metastasis. Small indeterminate RIGHT lung pulmonary nodules. · 5/14/2020 Ct Head W Contrast No evidence of intracranial metastatic disease. · 5/15/2020 Nm Bone Scan Whole Body No evidence of a bony metastatic disease. · 5/15/2020- FNA biopsy left supraclavicular lymph node consistent metastatic carcinoma. Insufficient tissue for further characterization. · 5/22/2020-PET-CT scan showed  Hypermetabolic thoracic disease involving the mediastinum and hilar stations, left greater than right. Associated obstructive pneumonitis in the left lower lobe. Right lower lobe nodules, may reflect metastatic disease.   Metastatic disease to nephrolithiasis;  MUSCULOSKELETAL: no joint pain, no swelling, no stiffness;  ENDOCRINE: no polyuria, no polydipsia, no cold or heat intolerance;  HEMATOLOGY: no easy bruising or bleeding, no history of clotting disorder;  DERMATOLOGY: no skin rash, no eczema, no pruritus;  PSYCHIATRY: no depression, no anxiety, no panic attacks, no suicidal ideation, no homicidal ideation;  NEUROLOGY: no syncope, no seizures, no numbness or tingling of hands, no numbness or tingling of feet, no paresis;    Objective   /68   Pulse 78   Temp 98.7 °F (37.1 °C)   Ht 5' 8\" (1.727 m)   Wt 116 lb 11.2 oz (52.9 kg)   SpO2 90%   BMI 17.74 kg/m²     PHYSICAL EXAM:  CONSTITUTIONAL: Alert, appropriate, no acute distress, ill-appearing  EYES: Non icteric, EOM intact, pupils equal round   ENT: Mucus membranes moist, no oral pharyngeal lesions, external inspection of ears and nose are normal  NECK: Supple, no masses. No palpable thyroid mass  CHEST/LUNGS: CTA bilaterally, normal respiratory effort   CARDIOVASCULAR: RRR, no murmurs. No lower extremity edema  ABDOMEN: soft non-tender, active bowel sounds, no HSM. No palpable masses  EXTREMITIES: warm, full ROM in all 4 extremities, no focal weakness. SKIN: warm, dry with no rashes or lesions  LYMPH: No cervical, clavicular, axillary, or inguinal lymphadenopathy  NEUROLOGIC: follows commands, non focal   PSYCH: mood and affect appropriate. Alert and oriented to time, place, person        LABORATORY RESULTS REVIEWED BY ME:  CBC:6/10/2020  WBC- 6.51  HGB- 11.4  PLT- 271,000  Neut- 4.76    RADIOLOGY STUDIES REVIEWED BY ME:    6/9/2020: Ct Soft Tissue Neck W Wo Contrast Lymph node is present in the supraclavicular region adjacent to the left side of the trachea. This is 34 mm in diameter. Additional mediastinal adenopathy is noted superior mediastinum adjacent to the left side of the trachea and a large 3.1 x 4.8 cm region of adenopathy is present in the aorticopulmonary window.  These

## 2020-06-10 ENCOUNTER — HOSPITAL ENCOUNTER (OUTPATIENT)
Dept: INFUSION THERAPY | Age: 80
Discharge: HOME | End: 2020-06-10
Payer: MEDICARE

## 2020-06-10 ENCOUNTER — HOSPITAL ENCOUNTER (OUTPATIENT)
Dept: RADIATION ONCOLOGY | Facility: HOSPITAL | Age: 80
Setting detail: RADIATION/ONCOLOGY SERIES
Discharge: HOME | End: 2020-06-10

## 2020-06-10 ENCOUNTER — OFFICE VISIT (OUTPATIENT)
Dept: HEMATOLOGY | Age: 80
End: 2020-06-10
Payer: MEDICARE

## 2020-06-10 VITALS
HEART RATE: 78 BPM | TEMPERATURE: 98.7 F | WEIGHT: 116.7 LBS | OXYGEN SATURATION: 90 % | SYSTOLIC BLOOD PRESSURE: 120 MMHG | DIASTOLIC BLOOD PRESSURE: 68 MMHG | BODY MASS INDEX: 17.69 KG/M2 | HEIGHT: 68 IN

## 2020-06-10 DIAGNOSIS — R91.8 LUNG MASS: ICD-10-CM

## 2020-06-10 PROBLEM — C80.1 SMALL CELL CARCINOMA (HCC): Status: ACTIVE | Noted: 2020-06-10

## 2020-06-10 PROBLEM — R53.83 OTHER FATIGUE: Status: ACTIVE | Noted: 2020-06-10

## 2020-06-10 LAB
BASOPHILS ABSOLUTE: 0.03 K/UL (ref 0.01–0.08)
BASOPHILS RELATIVE PERCENT: 0.5 % (ref 0.1–1.2)
EOSINOPHILS ABSOLUTE: 0.28 K/UL (ref 0.04–0.54)
EOSINOPHILS RELATIVE PERCENT: 4.3 % (ref 0.7–7)
HCT VFR BLD CALC: 35.6 % (ref 40.1–51)
HEMOGLOBIN: 11.4 G/DL (ref 13.7–17.5)
LYMPHOCYTES ABSOLUTE: 0.64 K/UL (ref 1.18–3.74)
LYMPHOCYTES RELATIVE PERCENT: 9.8 % (ref 19.3–53.1)
MCH RBC QN AUTO: 32.6 PG (ref 25.7–32.2)
MCHC RBC AUTO-ENTMCNC: 32 G/DL (ref 32.3–36.5)
MCV RBC AUTO: 101.7 FL (ref 79–92.2)
MONOCYTES ABSOLUTE: 0.8 K/UL (ref 0.24–0.82)
MONOCYTES RELATIVE PERCENT: 12.3 % (ref 4.7–12.5)
NEUTROPHILS ABSOLUTE: 4.76 K/UL (ref 1.56–6.13)
NEUTROPHILS RELATIVE PERCENT: 73.1 % (ref 34–71.1)
PDW BLD-RTO: 12.7 % (ref 11.6–14.4)
PLATELET # BLD: 271 K/UL (ref 163–337)
PMV BLD AUTO: 9.2 FL (ref 7.4–10.4)
RBC # BLD: 3.5 M/UL (ref 4.63–6.08)
WBC # BLD: 6.51 K/UL (ref 4.23–9.07)

## 2020-06-10 PROCEDURE — 99214 OFFICE O/P EST MOD 30 MIN: CPT | Performed by: INTERNAL MEDICINE

## 2020-06-10 PROCEDURE — 4004F PT TOBACCO SCREEN RCVD TLK: CPT | Performed by: INTERNAL MEDICINE

## 2020-06-10 PROCEDURE — 4040F PNEUMOC VAC/ADMIN/RCVD: CPT | Performed by: INTERNAL MEDICINE

## 2020-06-10 PROCEDURE — G8419 CALC BMI OUT NRM PARAM NOF/U: HCPCS | Performed by: INTERNAL MEDICINE

## 2020-06-10 PROCEDURE — G8427 DOCREV CUR MEDS BY ELIG CLIN: HCPCS | Performed by: INTERNAL MEDICINE

## 2020-06-10 PROCEDURE — 99211 OFF/OP EST MAY X REQ PHY/QHP: CPT

## 2020-06-10 PROCEDURE — 77412 RADIATION TX DELIVERY LVL 3: CPT | Performed by: RADIOLOGY

## 2020-06-10 PROCEDURE — 1123F ACP DISCUSS/DSCN MKR DOCD: CPT | Performed by: INTERNAL MEDICINE

## 2020-06-10 PROCEDURE — 85025 COMPLETE CBC W/AUTO DIFF WBC: CPT

## 2020-06-10 PROCEDURE — 77336 RADIATION PHYSICS CONSULT: CPT | Performed by: RADIOLOGY

## 2020-06-10 RX ORDER — PROMETHAZINE HYDROCHLORIDE 12.5 MG/1
12.5 TABLET ORAL EVERY 6 HOURS PRN
Qty: 90 TABLET | Refills: 1 | Status: SHIPPED | OUTPATIENT
Start: 2020-06-10

## 2020-06-11 ENCOUNTER — HOSPITAL ENCOUNTER (OUTPATIENT)
Dept: RADIATION ONCOLOGY | Facility: HOSPITAL | Age: 80
Setting detail: RADIATION/ONCOLOGY SERIES
Discharge: HOME | End: 2020-06-11

## 2020-06-11 PROCEDURE — 77412 RADIATION TX DELIVERY LVL 3: CPT | Performed by: RADIOLOGY

## 2020-06-11 RX ORDER — EPINEPHRINE 1 MG/ML
0.3 INJECTION, SOLUTION, CONCENTRATE INTRAVENOUS PRN
Status: CANCELLED | OUTPATIENT
Start: 2020-06-24

## 2020-06-11 RX ORDER — SODIUM CHLORIDE 9 MG/ML
INJECTION, SOLUTION INTRAVENOUS CONTINUOUS
Status: CANCELLED | OUTPATIENT
Start: 2020-06-24

## 2020-06-11 RX ORDER — SODIUM CHLORIDE 0.9 % (FLUSH) 0.9 %
5 SYRINGE (ML) INJECTION PRN
Status: CANCELLED | OUTPATIENT
Start: 2020-06-24

## 2020-06-11 RX ORDER — DIPHENHYDRAMINE HYDROCHLORIDE 50 MG/ML
50 INJECTION INTRAMUSCULAR; INTRAVENOUS ONCE
Status: CANCELLED | OUTPATIENT
Start: 2020-06-26

## 2020-06-11 RX ORDER — SODIUM CHLORIDE 9 MG/ML
INJECTION, SOLUTION INTRAVENOUS CONTINUOUS
Status: CANCELLED | OUTPATIENT
Start: 2020-06-26

## 2020-06-11 RX ORDER — SODIUM CHLORIDE 9 MG/ML
20 INJECTION, SOLUTION INTRAVENOUS ONCE
Status: CANCELLED | OUTPATIENT
Start: 2020-06-26

## 2020-06-11 RX ORDER — SODIUM CHLORIDE 0.9 % (FLUSH) 0.9 %
10 SYRINGE (ML) INJECTION PRN
Status: CANCELLED | OUTPATIENT
Start: 2020-06-24

## 2020-06-11 RX ORDER — DIPHENHYDRAMINE HYDROCHLORIDE 50 MG/ML
50 INJECTION INTRAMUSCULAR; INTRAVENOUS ONCE
Status: CANCELLED | OUTPATIENT
Start: 2020-06-25

## 2020-06-11 RX ORDER — EPINEPHRINE 1 MG/ML
0.3 INJECTION, SOLUTION, CONCENTRATE INTRAVENOUS PRN
Status: CANCELLED | OUTPATIENT
Start: 2020-06-25

## 2020-06-11 RX ORDER — SODIUM CHLORIDE 0.9 % (FLUSH) 0.9 %
10 SYRINGE (ML) INJECTION PRN
Status: CANCELLED | OUTPATIENT
Start: 2020-06-25

## 2020-06-11 RX ORDER — PALONOSETRON 0.05 MG/ML
0.25 INJECTION, SOLUTION INTRAVENOUS ONCE
Status: CANCELLED | OUTPATIENT
Start: 2020-06-24

## 2020-06-11 RX ORDER — HEPARIN SODIUM (PORCINE) LOCK FLUSH IV SOLN 100 UNIT/ML 100 UNIT/ML
500 SOLUTION INTRAVENOUS PRN
Status: CANCELLED | OUTPATIENT
Start: 2020-06-26

## 2020-06-11 RX ORDER — SODIUM CHLORIDE 0.9 % (FLUSH) 0.9 %
10 SYRINGE (ML) INJECTION PRN
Status: CANCELLED | OUTPATIENT
Start: 2020-06-26

## 2020-06-11 RX ORDER — METHYLPREDNISOLONE SODIUM SUCCINATE 125 MG/2ML
125 INJECTION, POWDER, LYOPHILIZED, FOR SOLUTION INTRAMUSCULAR; INTRAVENOUS ONCE
Status: CANCELLED | OUTPATIENT
Start: 2020-06-25

## 2020-06-11 RX ORDER — SODIUM CHLORIDE 9 MG/ML
20 INJECTION, SOLUTION INTRAVENOUS ONCE
Status: CANCELLED | OUTPATIENT
Start: 2020-06-25

## 2020-06-11 RX ORDER — SODIUM CHLORIDE 9 MG/ML
20 INJECTION, SOLUTION INTRAVENOUS ONCE
Status: CANCELLED | OUTPATIENT
Start: 2020-06-24

## 2020-06-11 RX ORDER — METHYLPREDNISOLONE SODIUM SUCCINATE 125 MG/2ML
125 INJECTION, POWDER, LYOPHILIZED, FOR SOLUTION INTRAMUSCULAR; INTRAVENOUS ONCE
Status: CANCELLED | OUTPATIENT
Start: 2020-06-26

## 2020-06-11 RX ORDER — DIPHENHYDRAMINE HYDROCHLORIDE 50 MG/ML
50 INJECTION INTRAMUSCULAR; INTRAVENOUS ONCE
Status: CANCELLED | OUTPATIENT
Start: 2020-06-24

## 2020-06-11 RX ORDER — HEPARIN SODIUM (PORCINE) LOCK FLUSH IV SOLN 100 UNIT/ML 100 UNIT/ML
500 SOLUTION INTRAVENOUS PRN
Status: CANCELLED | OUTPATIENT
Start: 2020-06-24

## 2020-06-11 RX ORDER — SODIUM CHLORIDE 0.9 % (FLUSH) 0.9 %
5 SYRINGE (ML) INJECTION PRN
Status: CANCELLED | OUTPATIENT
Start: 2020-06-25

## 2020-06-11 RX ORDER — METHYLPREDNISOLONE SODIUM SUCCINATE 125 MG/2ML
125 INJECTION, POWDER, LYOPHILIZED, FOR SOLUTION INTRAMUSCULAR; INTRAVENOUS ONCE
Status: CANCELLED | OUTPATIENT
Start: 2020-06-24

## 2020-06-11 RX ORDER — EPINEPHRINE 1 MG/ML
0.3 INJECTION, SOLUTION, CONCENTRATE INTRAVENOUS PRN
Status: CANCELLED | OUTPATIENT
Start: 2020-06-26

## 2020-06-11 RX ORDER — SODIUM CHLORIDE 0.9 % (FLUSH) 0.9 %
5 SYRINGE (ML) INJECTION PRN
Status: CANCELLED | OUTPATIENT
Start: 2020-06-26

## 2020-06-11 RX ORDER — SODIUM CHLORIDE 9 MG/ML
INJECTION, SOLUTION INTRAVENOUS CONTINUOUS
Status: CANCELLED | OUTPATIENT
Start: 2020-06-25

## 2020-06-11 RX ORDER — HEPARIN SODIUM (PORCINE) LOCK FLUSH IV SOLN 100 UNIT/ML 100 UNIT/ML
500 SOLUTION INTRAVENOUS PRN
Status: CANCELLED | OUTPATIENT
Start: 2020-06-25

## 2020-06-12 ENCOUNTER — OFFICE VISIT (OUTPATIENT)
Dept: OTOLARYNGOLOGY | Facility: CLINIC | Age: 80
End: 2020-06-12

## 2020-06-12 ENCOUNTER — HOSPITAL ENCOUNTER (OUTPATIENT)
Dept: RADIATION ONCOLOGY | Facility: HOSPITAL | Age: 80
Setting detail: RADIATION/ONCOLOGY SERIES
Discharge: HOME | End: 2020-06-12

## 2020-06-12 DIAGNOSIS — Z48.02 ENCOUNTER FOR REMOVAL OF SUTURES: Primary | ICD-10-CM

## 2020-06-12 PROCEDURE — 77417 THER RADIOLOGY PORT IMAGE(S): CPT | Performed by: RADIOLOGY

## 2020-06-12 PROCEDURE — 99024 POSTOP FOLLOW-UP VISIT: CPT | Performed by: PHYSICIAN ASSISTANT

## 2020-06-12 PROCEDURE — 77412 RADIATION TX DELIVERY LVL 3: CPT | Performed by: RADIOLOGY

## 2020-06-12 RX ORDER — SILODOSIN 8 MG/1
CAPSULE ORAL
COMMUNITY
Start: 2020-06-04

## 2020-06-12 RX ORDER — FENTANYL 50 UG/H
PATCH TRANSDERMAL
COMMUNITY
Start: 2020-06-05

## 2020-06-12 RX ORDER — MORPHINE SULFATE 20 MG/ML
SOLUTION ORAL
COMMUNITY
Start: 2020-06-04

## 2020-06-12 RX ORDER — PROMETHAZINE HYDROCHLORIDE 12.5 MG/1
12.5 TABLET ORAL
COMMUNITY
Start: 2020-06-10

## 2020-06-12 RX ORDER — NALOXEGOL OXALATE 25 MG/1
TABLET, FILM COATED ORAL
COMMUNITY
Start: 2020-06-04

## 2020-06-12 NOTE — PROGRESS NOTES
" ROQUE Larson   History Of Present Illness:  Adebayo Ruiz is a 80 y.o. male who presents for suture/staple removal after surgery.    Tissue Pathology Exam: [id number]   Order: [id number]   Status:  Final result   Visible to patient:  No (Not Released) Next appt:  Today at 01:30 PM in Radiation Oncology ([facility]) Dx:  Supraclavicular lymphadenopathy   Specimen Information: Lymph Node        Component    Case Report   Surgical Pathology Report                         Case: [id number]                                   Authorizing Provider:  William Garcia MD    Collected:           06/01/2020 12:53 PM           Ordering Location:     [facility] OR  Received:            06/01/2020 01:49 PM           Pathologist:           Lina Corona MD                                                         Specimen:    Lymph Node, Left Supracavicular lymph node biopsy                                          Final Diagnosis   \"Left supraclavicular lymph node biopsy\": Small cell carcinoma infiltrating fibroadipose tissue.   Electronically signed by Lina Corona MD on 6/3/2020 at 1704             Physical exam:  Sutures/staples removed, wound healing appropriately without overt infection or inflammation        Assessment:  Postoperative Examination Following Surgery V67.00    Plan:  call for wound redness, swelling or breakdown   Wound care instructions given     ROQUE Larson  6/12/2020   09:30      "

## 2020-06-12 NOTE — PATIENT INSTRUCTIONS
Protect the incisions from sunlight. Sunlight to the incisions will cause permanent pigmentation to the incision line and make the incision more noticeable. After the incision has reepithelialized (typically 2-3 weeks after the procedure), you may begin to use sunscreen with an SPF of 15 or greater    For the first week after your procedure, apply a thin coat of Vaseline to the incision 3-4 times daily.  Starting the second week, use a silicone-based wound cream to the incisions to optimize the end result. Apply topically twice daily, or as directed, to help optimize wound healing and decrease redness.    It is very important to continue routine skin checks with a dermatologist or your PCP every 6-12 months.

## 2020-06-15 ENCOUNTER — OFFICE VISIT (OUTPATIENT)
Dept: GASTROENTEROLOGY | Age: 80
End: 2020-06-15
Payer: MEDICARE

## 2020-06-15 ENCOUNTER — HOSPITAL ENCOUNTER (OUTPATIENT)
Dept: RADIATION ONCOLOGY | Facility: HOSPITAL | Age: 80
Setting detail: RADIATION/ONCOLOGY SERIES
Discharge: HOME | End: 2020-06-15

## 2020-06-15 VITALS
BODY MASS INDEX: 16.97 KG/M2 | DIASTOLIC BLOOD PRESSURE: 70 MMHG | WEIGHT: 112 LBS | HEIGHT: 68 IN | HEART RATE: 83 BPM | SYSTOLIC BLOOD PRESSURE: 160 MMHG | OXYGEN SATURATION: 99 %

## 2020-06-15 PROCEDURE — 1123F ACP DISCUSS/DSCN MKR DOCD: CPT | Performed by: NURSE PRACTITIONER

## 2020-06-15 PROCEDURE — G8427 DOCREV CUR MEDS BY ELIG CLIN: HCPCS | Performed by: NURSE PRACTITIONER

## 2020-06-15 PROCEDURE — 4040F PNEUMOC VAC/ADMIN/RCVD: CPT | Performed by: NURSE PRACTITIONER

## 2020-06-15 PROCEDURE — G8419 CALC BMI OUT NRM PARAM NOF/U: HCPCS | Performed by: NURSE PRACTITIONER

## 2020-06-15 PROCEDURE — 99215 OFFICE O/P EST HI 40 MIN: CPT | Performed by: NURSE PRACTITIONER

## 2020-06-15 PROCEDURE — 77412 RADIATION TX DELIVERY LVL 3: CPT | Performed by: RADIOLOGY

## 2020-06-15 PROCEDURE — 1036F TOBACCO NON-USER: CPT | Performed by: NURSE PRACTITIONER

## 2020-06-15 RX ORDER — FENTANYL 50 UG/H
PATCH TRANSDERMAL
COMMUNITY
Start: 2020-06-05

## 2020-06-15 ASSESSMENT — ENCOUNTER SYMPTOMS
SHORTNESS OF BREATH: 0
VOMITING: 0
ABDOMINAL DISTENTION: 0
DIARRHEA: 0
TROUBLE SWALLOWING: 1
ABDOMINAL PAIN: 0
CHOKING: 0
NAUSEA: 0
COUGH: 0
RECTAL PAIN: 0
BLOOD IN STOOL: 0
ANAL BLEEDING: 0
CONSTIPATION: 0

## 2020-06-15 NOTE — PROGRESS NOTES
nasopharynx, oropharynx, and hypopharynx are   unremarkable. The , retropharyngeal, parapharyngeal,   carotid, paratracheal and prevertebral spaces are unremarkable. The   vocal cords are normal in appearance. The parotid and submandibular glands are normal in appearance. High-grade stenosis right internal carotid arteries again noted this   was described November 20, 2019. Centrilobular emphysematous changes noted in the lungs. Small   posterior left pleural effusions present. The osseous structures in the skullbase and cervical spine demonstrate   no acute abnormalities.        Impression   1. Lymph node is present in the supraclavicular region adjacent to the   left side of the trachea. This is 34 mm in diameter. Additional   mediastinal adenopathy is noted superior mediastinum adjacent to the   left side of the trachea and a large 3.1 x 4.8 cm region of adenopathy   is present in the aorticopulmonary window. These lymph nodes are all   described on the PET CT scan May 22, 2020 and are unchanged. 2. High-grade stenosis right internal carotid artery similar to CT   angiogram November 20, 2019   Signed by Dr Sabiha Gaitan on 6/9/2020 11:36 AM     Hx CAD with CABG   Pt has increased risk factors for sedation due to history of CAD. This was my first time assessing Mr. Calin Lozano:     1. Small cell carcinoma ([address]    2. Dysphagia, unspecified type    3. Weight loss            Plan:   - Schedule EGD - ASAP  Nothing to eat or drink after midnight. No driving for 24 hours after procedure. Bring a  to procedure. No aspirin, NSAIDs, fish oil 5 days before procedure. I have discussed the benefits, alternatives, and risks (including bleeding, perforation and death)  for pursuing Endoscopy (EGD/Colonscopy/EUS/ERCP) with the patient and they are willing to continue.  We also discussed the need for anesthesia, IV access, proper dietary changes, medication changes if necessary, and Never Used   Substance and Sexual Activity    Alcohol use: Never     Frequency: Never    Drug use: Never    Sexual activity: None   Lifestyle    Physical activity     Days per week: None     Minutes per session: None    Stress: None   Relationships    Social connections     Talks on phone: None     Gets together: None     Attends Confucianism service: None     Active member of club or organization: None     Attends meetings of clubs or organizations: None     Relationship status: None    Intimate partner violence     Fear of current or ex partner: None     Emotionally abused: None     Physically abused: None     Forced sexual activity: None   Other Topics Concern    None   Social History Narrative    None       Current Outpatient Medications   Medication Sig Dispense Refill    fentaNYL (DURAGESIC) 50 MCG/HR every 72 hours.  promethazine (PHENERGAN) 12.5 MG tablet Take 1 tablet by mouth every 6 hours as needed for Nausea 90 tablet 1    Morphine Sulfate ER (ARYMO ER) 15 MG TBEA Take 15 mg by mouth every 12 hours for 30 days.  Intended supply: 30 days 60 each 0    cloNIDine (CATAPRES) 0.1 MG tablet Take 0.1 mg by mouth 2 times daily      metFORMIN (GLUCOPHAGE) 1000 MG tablet Take 1,000 mg by mouth 2 times daily (with meals)      albuterol sulfate  (90 Base) MCG/ACT inhaler Inhale 2 puffs into the lungs every 6 hours as needed for Wheezing      guaiFENesin (MUCINEX) 600 MG extended release tablet Take 1,200 mg by mouth 2 times daily      aspirin 81 MG tablet Take 81 mg by mouth daily      metoprolol succinate (TOPROL XL) 50 MG extended release tablet Take 50 mg by mouth daily      pantoprazole (PROTONIX) 40 MG tablet Take 40 mg by mouth daily      lisinopril (PRINIVIL;ZESTRIL) 20 MG tablet Take 20 mg by mouth daily      atorvastatin (LIPITOR) 40 MG tablet Take 40 mg by mouth daily      tamsulosin (FLOMAX) 0.4 MG capsule Take 0.4 mg by mouth daily       No current facility-administered Musculoskeletal: Normal range of motion. Skin:     General: Skin is warm and dry. Findings: No rash. Nails: There is no clubbing. Neurological:      Mental Status: He is alert and oriented to person, place, and time. Motor: Weakness (generalized) present. Gait: Gait normal.   Psychiatric:         Behavior: Behavior normal.         Thought Content:  Thought content normal.

## 2020-06-15 NOTE — PATIENT INSTRUCTIONS
Schedule endoscopy    Nothing to eat or drink after midnight., the night before the procedure  You will not be able to drive for 24 hours after the procedure due to sedation. Must have a responsible adult, 25 year or older, to accompany you and drive you home. No aspirin, ibuprofen, naproxen, fish oil or vitamin E for 5 days before procedure. Continue current medications. If you are on blood thinners, clearance from the prescribing physician will be obtained before your procedure is scheduled. If biopsies are taken during the procedure they will be sent to a pathologist for analysis. You will be notified by mail of the pathology results in 2-3 weeks. Your physician may also schedule a follow up appointment with the nurse practitioner to discuss pathology, symptoms or to check if you have had any problems related to your procedure. If you prefer not to return to the office after your procedure, please discuss this with your physician on the day of your procedure.

## 2020-06-16 ENCOUNTER — TELEPHONE (OUTPATIENT)
Dept: HEMATOLOGY | Age: 80
End: 2020-06-16

## 2020-06-16 ENCOUNTER — HOSPITAL ENCOUNTER (OUTPATIENT)
Dept: RADIATION ONCOLOGY | Facility: HOSPITAL | Age: 80
Setting detail: RADIATION/ONCOLOGY SERIES
Discharge: HOME | End: 2020-06-16

## 2020-06-16 ENCOUNTER — OFFICE VISIT (OUTPATIENT)
Age: 80
End: 2020-06-16

## 2020-06-16 VITALS — HEART RATE: 82 BPM | TEMPERATURE: 98.4 F | OXYGEN SATURATION: 94 %

## 2020-06-16 PROCEDURE — 77412 RADIATION TX DELIVERY LVL 3: CPT | Performed by: RADIOLOGY

## 2020-06-16 NOTE — TELEPHONE ENCOUNTER
PT DAUGHTER CALLED AND WANTED TO CX ALL HIS APPT'S FOR June AND July. SAID IF THEY DECIDE TO THEY WILL CALL BACK TO GET BACK ON CYCLE.

## 2020-06-17 ENCOUNTER — HOSPITAL ENCOUNTER (OUTPATIENT)
Dept: RADIATION ONCOLOGY | Facility: HOSPITAL | Age: 80
Setting detail: RADIATION/ONCOLOGY SERIES
Discharge: HOME | End: 2020-06-17

## 2020-06-17 PROCEDURE — 77412 RADIATION TX DELIVERY LVL 3: CPT | Performed by: RADIOLOGY

## 2020-06-17 PROCEDURE — 77336 RADIATION PHYSICS CONSULT: CPT | Performed by: RADIOLOGY

## 2020-06-18 ENCOUNTER — HOSPITAL ENCOUNTER (OUTPATIENT)
Dept: RADIATION ONCOLOGY | Facility: HOSPITAL | Age: 80
Setting detail: RADIATION/ONCOLOGY SERIES
Discharge: HOME | End: 2020-06-18

## 2020-06-18 LAB
REPORT: NORMAL
SARS-COV-2: NOT DETECTED
THIS TEST SENT TO: NORMAL

## 2020-06-18 PROCEDURE — 77412 RADIATION TX DELIVERY LVL 3: CPT | Performed by: RADIOLOGY

## 2020-06-19 ENCOUNTER — ANESTHESIA (OUTPATIENT)
Dept: ENDOSCOPY | Age: 80
End: 2020-06-19
Payer: MEDICARE

## 2020-06-19 ENCOUNTER — ANESTHESIA EVENT (OUTPATIENT)
Dept: ENDOSCOPY | Age: 80
End: 2020-06-19
Payer: MEDICARE

## 2020-06-19 ENCOUNTER — HOSPITAL ENCOUNTER (OUTPATIENT)
Age: 80
Setting detail: OUTPATIENT SURGERY
Discharge: HOME | End: 2020-06-19
Attending: INTERNAL MEDICINE | Admitting: INTERNAL MEDICINE
Payer: MEDICARE

## 2020-06-19 VITALS
DIASTOLIC BLOOD PRESSURE: 73 MMHG | TEMPERATURE: 99.2 F | HEIGHT: 68 IN | HEART RATE: 70 BPM | WEIGHT: 107 LBS | RESPIRATION RATE: 16 BRPM | OXYGEN SATURATION: 97 % | BODY MASS INDEX: 16.22 KG/M2 | SYSTOLIC BLOOD PRESSURE: 154 MMHG

## 2020-06-19 VITALS
OXYGEN SATURATION: 98 % | DIASTOLIC BLOOD PRESSURE: 46 MMHG | TEMPERATURE: 97 F | RESPIRATION RATE: 10 BRPM | SYSTOLIC BLOOD PRESSURE: 90 MMHG

## 2020-06-19 LAB
GLUCOSE BLD-MCNC: 99 MG/DL (ref 70–99)
PERFORMED ON: NORMAL

## 2020-06-19 PROCEDURE — 2709999900 HC NON-CHARGEABLE SUPPLY: Performed by: INTERNAL MEDICINE

## 2020-06-19 PROCEDURE — C1769 GUIDE WIRE: HCPCS | Performed by: INTERNAL MEDICINE

## 2020-06-19 PROCEDURE — 82947 ASSAY GLUCOSE BLOOD QUANT: CPT

## 2020-06-19 PROCEDURE — 7100000010 HC PHASE II RECOVERY - FIRST 15 MIN: Performed by: INTERNAL MEDICINE

## 2020-06-19 PROCEDURE — 3700000000 HC ANESTHESIA ATTENDED CARE: Performed by: INTERNAL MEDICINE

## 2020-06-19 PROCEDURE — 3609013300 HC EGD TUBE PLACEMENT: Performed by: INTERNAL MEDICINE

## 2020-06-19 PROCEDURE — 3700000001 HC ADD 15 MINUTES (ANESTHESIA): Performed by: INTERNAL MEDICINE

## 2020-06-19 PROCEDURE — 2500000003 HC RX 250 WO HCPCS: Performed by: NURSE ANESTHETIST, CERTIFIED REGISTERED

## 2020-06-19 PROCEDURE — 2580000003 HC RX 258: Performed by: ANESTHESIOLOGY

## 2020-06-19 PROCEDURE — 6360000002 HC RX W HCPCS: Performed by: INTERNAL MEDICINE

## 2020-06-19 PROCEDURE — 43246 EGD PLACE GASTROSTOMY TUBE: CPT | Performed by: INTERNAL MEDICINE

## 2020-06-19 PROCEDURE — 3609017100 HC EGD: Performed by: INTERNAL MEDICINE

## 2020-06-19 PROCEDURE — 7100000011 HC PHASE II RECOVERY - ADDTL 15 MIN: Performed by: INTERNAL MEDICINE

## 2020-06-19 PROCEDURE — 6360000002 HC RX W HCPCS: Performed by: NURSE ANESTHETIST, CERTIFIED REGISTERED

## 2020-06-19 PROCEDURE — 2580000003 HC RX 258: Performed by: INTERNAL MEDICINE

## 2020-06-19 RX ORDER — DIPHENHYDRAMINE HYDROCHLORIDE 50 MG/ML
12.5 INJECTION INTRAMUSCULAR; INTRAVENOUS
Status: DISCONTINUED | OUTPATIENT
Start: 2020-06-19 | End: 2020-06-19 | Stop reason: HOSPADM

## 2020-06-19 RX ORDER — LIDOCAINE HYDROCHLORIDE 10 MG/ML
INJECTION, SOLUTION EPIDURAL; INFILTRATION; INTRACAUDAL; PERINEURAL PRN
Status: DISCONTINUED | OUTPATIENT
Start: 2020-06-19 | End: 2020-06-19 | Stop reason: SDUPTHER

## 2020-06-19 RX ORDER — PROPOFOL 10 MG/ML
INJECTION, EMULSION INTRAVENOUS PRN
Status: DISCONTINUED | OUTPATIENT
Start: 2020-06-19 | End: 2020-06-19 | Stop reason: SDUPTHER

## 2020-06-19 RX ORDER — SODIUM CHLORIDE, SODIUM LACTATE, POTASSIUM CHLORIDE, CALCIUM CHLORIDE 600; 310; 30; 20 MG/100ML; MG/100ML; MG/100ML; MG/100ML
INJECTION, SOLUTION INTRAVENOUS CONTINUOUS
Status: DISCONTINUED | OUTPATIENT
Start: 2020-06-19 | End: 2020-06-19 | Stop reason: HOSPADM

## 2020-06-19 RX ORDER — ONDANSETRON 2 MG/ML
4 INJECTION INTRAMUSCULAR; INTRAVENOUS
Status: DISCONTINUED | OUTPATIENT
Start: 2020-06-19 | End: 2020-06-19 | Stop reason: HOSPADM

## 2020-06-19 RX ORDER — PROMETHAZINE HYDROCHLORIDE 25 MG/ML
6.25 INJECTION, SOLUTION INTRAMUSCULAR; INTRAVENOUS
Status: DISCONTINUED | OUTPATIENT
Start: 2020-06-19 | End: 2020-06-19 | Stop reason: HOSPADM

## 2020-06-19 RX ADMIN — SODIUM CHLORIDE, POTASSIUM CHLORIDE, SODIUM LACTATE AND CALCIUM CHLORIDE: 600; 310; 30; 20 INJECTION, SOLUTION INTRAVENOUS at 06:25

## 2020-06-19 RX ADMIN — PROPOFOL 225 MG: 10 INJECTION, EMULSION INTRAVENOUS at 07:15

## 2020-06-19 RX ADMIN — LIDOCAINE HYDROCHLORIDE 5 ML: 10 INJECTION, SOLUTION EPIDURAL; INFILTRATION; INTRACAUDAL; PERINEURAL at 07:15

## 2020-06-19 RX ADMIN — VANCOMYCIN HYDROCHLORIDE 1000 MG: 10 INJECTION, POWDER, LYOPHILIZED, FOR SOLUTION INTRAVENOUS at 06:28

## 2020-06-19 ASSESSMENT — ENCOUNTER SYMPTOMS: SHORTNESS OF BREATH: 1

## 2020-06-19 NOTE — ANESTHESIA PRE PROCEDURE
Current medications:    Current Facility-Administered Medications   Medication Dose Route Frequency Provider Last Rate Last Dose    lactated ringers infusion   Intravenous Continuous Claudia Do  mL/hr at 06/19/20 0625      vancomycin (VANCOCIN) 1 g in dextrose 5% 250 mL IVPB  1,000 mg Intravenous Once Hansel Gallego MD   1,000 mg at 06/19/20 1565       Allergies:     Allergies   Allergen Reactions    Amoxicillin-Pot Clavulanate Nausea And Vomiting     Other reaction(s): stomach pain       Problem List:    Patient Active Problem List   Diagnosis Code    Hypercholesterolemia E78.00    DJD (degenerative joint disease) M19.90    Arthritis M19.90    BPH (benign prostatic hyperplasia) N40.0    HTN (hypertension) I10    DM (diabetes mellitus), type 2 ([address] E11.9    Carotid artery stenosis I65.29    Hypercholesteremia E78.00    Pneumonia J18.9    Lung mass R91.8    Chest pain R07.9    Small cell carcinoma (HCC) C80.1    Other fatigue R53.83       Past Medical History:        Diagnosis Date    Arteriosclerotic heart disease (ASHD)     Arthritis     BPH (benign prostatic hyperplasia)     Cancer ([address]     Carotid artery stenosis     DJD (degenerative joint disease)     DM (diabetes mellitus), type 2 ([address]     HTN (hypertension)     Hypercholesteremia     Hypercholesterolemia     Polyosteoarthritis     PVD (peripheral vascular disease) ([address]        Past Surgical History:        Procedure Laterality Date    CARDIAC CATHETERIZATION  09/2005    CAROTID ENDARTERECTOMY      CATARACT REMOVAL WITH IMPLANT      CHOLECYSTECTOMY, LAPAROSCOPIC  04/22/2011    with oversew of preforated peptic ulcer with joel patch closure     COLONOSCOPY  appprox 2016    Meera: \" normal\" per pt recall    COLONOSCOPY  07/25/2005    Dr Flynn Whitlock x 1, HP x 2, 2 yr recall    CORONARY ARTERY BYPASS GRAFT         Social History:    Social History     Tobacco Use    Smoking status: Former Smoker

## 2020-06-19 NOTE — PROGRESS NOTES
Received consult for EN recommendations and education to family. Pt 79y/o male, 107 LB, 5'8\", BMI 16.3  Hx- Diabetes, on Metformin at home  Calculated pt's nutritional needs  3481-1666 kcals (30-35 kcals/kg), 48-96g protein and 9803-3318 ml free water. Pt meets criteria for severe malnutrition AEB severe depletion of fat and muscle mass. Aware PEG placed and formula administration to be started tomorrow. Pt has not had anything to eat since last Saturday, therefore recommend starting bolus feedings at 120ml Glucerna 1.2 3-4x/day. Work toward goal of 1320ml or 5.5 cans of Glucerna 1.2. Glucerna is designed for a patient with glucose intolerance. Pt on oral medication with accuchek's usually under 120. Jevity 1.2 would give over 200g CHO and could cause a spike in blood sugars causing need for extra medication. Glucerna 1.2 at 1320ml will provide-----  1584 kcals with 79 g protein, 151 g CHO and 1062 ml free water. Have explained to daughter to flush PEG with water before and after tube feeding. 60ml or so would be appropriate. Thank you for the consult.     Alyson Obrien MS RD LD  6/19/2020  09:18 AM

## 2020-06-19 NOTE — H&P
Patient Name: Todd Paulino  : 1940  MRN: [id number]  DATE: 20    Allergies: Allergies   Allergen Reactions    Amoxicillin-Pot Clavulanate Nausea And Vomiting     Other reaction(s): stomach pain        ENDOSCOPY  History and Physical    Procedure:    [] Diagnostic Colonoscopy       [] Screening Colonoscopy  [x] EGD      [] ERCP      [] EUS       [x] Other-with PEG tube placement    [x] Previous office notes/History and Physical reviewed from the patients chart. Please see EMR for further details of HPI. I have examined the patient's status immediately prior to the procedure and:      Indications/HPI:     1. Small cell carcinoma (HCC) -Stage IV   2. Dysphagia, unspecified type -to both liquids and solids \"not able to swallow water\"   3. Weight loss    4. Malnutrition and unable to ingest even medications-needs Enteral access to enable administration of meds and nutrition     []Abdominal Pain   []Cancer- GI/Lung     []Fhx of colon CA/polyps  []History of Polyps  []Barretts            []Melena  []Abnormal Imaging              []Dysphagia              []Persistent Pneumonia   []Anemia                            []Food Impaction        []History of Polyps  [] GI Bleed             []Pulmonary nodule/Mass   []Change in bowel habits []Heartburn/Reflux  []Rectal Bleed (BRBPR)  []Chest Pain - Non Cardiac []Heme (+) Stool []Ulcers  []Constipation  []Hemoptysis  []Varices  []Diarrhea  []Hypoxemia    []Nausea/Vomiting   []Screening   []Crohns/Colitis  []Other:     Anesthesia:   [x] MAC [] Moderate Sedation   [] General   [] None     ROS: 12 pt Review of Symptoms was negative unless mentioned above    Medications:   Prior to Admission medications    Medication Sig Start Date End Date Taking? Authorizing Provider   fentaNYL (DURAGESIC) 50 MCG/HR every 72 hours. 20  Yes Historical Provider, MD   Morphine Sulfate ER (ARYMO ER) 15 MG TBEA Take 15 mg by mouth every 12 hours for 30 days.  Intended supply: 30 days 5/27/20 6/26/20 Yes Milly Juarez MD   cloNIDine (CATAPRES) 0.1 MG tablet Take 0.1 mg by mouth 2 times daily   Yes Historical Provider, MD   metFORMIN (GLUCOPHAGE) 1000 MG tablet Take 1,000 mg by mouth 2 times daily (with meals)   Yes Historical Provider, MD   albuterol sulfate  (90 Base) MCG/ACT inhaler Inhale 2 puffs into the lungs every 6 hours as needed for Wheezing   Yes Historical Provider, MD   guaiFENesin (MUCINEX) 600 MG extended release tablet Take 1,200 mg by mouth 2 times daily   Yes Historical Provider, MD   aspirin 81 MG tablet Take 81 mg by mouth daily   Yes Historical Provider, MD   metoprolol succinate (TOPROL XL) 50 MG extended release tablet Take 50 mg by mouth daily   Yes Historical Provider, MD   pantoprazole (PROTONIX) 40 MG tablet Take 40 mg by mouth daily   Yes Historical Provider, MD   lisinopril (PRINIVIL;ZESTRIL) 20 MG tablet Take 20 mg by mouth daily   Yes Historical Provider, MD   atorvastatin (LIPITOR) 40 MG tablet Take 40 mg by mouth daily   Yes Historical Provider, MD   promethazine (PHENERGAN) 12.5 MG tablet Take 1 tablet by mouth every 6 hours as needed for Nausea 6/10/20   Milly Juarez MD   tamsulosin (FLOMAX) 0.4 MG capsule Take 0.4 mg by mouth daily    Historical Provider, MD       Past Medical History:  Past Medical History:   Diagnosis Date    Arteriosclerotic heart disease (ASHD)     Arthritis     BPH (benign prostatic hyperplasia)     [facility]     Carotid artery stenosis     DJD (degenerative joint disease)     DM (diabetes mellitus), type 2 ([address]     HTN (hypertension)     Hypercholesteremia     Hypercholesterolemia     Polyosteoarthritis     PVD (peripheral vascular disease) ([facility]        Past Surgical History:  Past Surgical History:   Procedure Laterality Date    CARDIAC CATHETERIZATION  09/2005    CAROTID ENDARTERECTOMY      CATARACT REMOVAL WITH IMPLANT      CHOLECYSTECTOMY, LAPAROSCOPIC  04/22/2011    with oversew of

## 2020-06-19 NOTE — OP NOTE
Endoscopic Procedure Note    Patient: Sara Peed: 1940  Med Rec#: [id number] Acc#: [id number]     Primary Care Provider Winston Smith MD    Endoscopist: Jude Muller MD    Date of Procedure:  6/19/2020    Procedure:   1. EGD up to the distal duodenum with placement of a BARD 20 F PEG tube into the body of the stomach. Indications:     1. Small cell carcinoma (HCC) -Stage IV   2. Dysphagia, unspecified type -to both liquids and solids \"not able to swallow water\"   3. Weight loss    4. Malnutrition and unable to ingest even medications-needs Enteral access to enable administration of meds and nutrition  Anesthesia:  Sedation was administered by anesthesia who monitored the patient during the procedure. Estimated Blood Loss: minimal    Procedure:   After reviewing the patient's chart and obtaining informed consent, the patient was placed in the left lateral decubitus position. A forward-viewing Olympus endoscope was lubricated and inserted through the mouth into the oropharynx. Under direct visualization, the upper esophagus was intubated. The scope was advanced to the level of the third portion of duodenum. Scope was slowly withdrawn with careful inspection of the mucosal surfaces. The scope was retroflexed for inspection of the gastric fundus and incisura. Findings and maneuvers are listed in impression below. The scope was then withdrawn back into the stomach where the stomach was maximally insufflated. An area of 1:1 displacement was chosen with the index finger at a maximum area of transillumination.     This area was prepped and draped in a sterile fashion. Local anesthesia was  achieved with 1% Xylocaine. A small incision was made, and the trocar was  passed through the incision . Through the trocar, a guidewire was passed and  grasped with the endoscopic polypectomy snare and removed via the mouth.  Over the proximal end of the guidewire, a 20-Cymro Bard feeding

## 2020-06-19 NOTE — CARE COORDINATION
Requested assistance with arranging OP feeds for the pt post op. JUSTO contacted Julianne Walker with [facility] to coordinate and will send order and documentation when entered. [facility]  PH: [phone]  FA: [phone]      Electronically signed by Elzbieta Ivey on 6/19/2020 at 11:56 AM  Faxed [facility] form completed and documentation.

## 2020-06-19 NOTE — ANESTHESIA POSTPROCEDURE EVALUATION
Department of Anesthesiology  Postprocedure Note    Patient: Juan C Peñaloza  MRN: [id number]  [place]: 1940  Date of evaluation: 6/19/2020  Time:  7:50 AM     Procedure Summary     Date:  06/19/20 Room / Location:  [address]    Anesthesia Start:  0703 Anesthesia Stop:  7347    Procedures:       EGD ESOPHAGOGASTRODUODENOSCOPY (N/A )      EGD ESOPHAGOGASTRODUODENOSCOPY PEG TUBE INSERTION (N/A ) Diagnosis:  (HX LUNG CANCER, DYSPHAGIA, WT LOSS - PEG TUBE PLACEMENT)    Surgeon:  Kishore Calzada MD Responsible Provider:  ROOSEVELT Farah CRNA    Anesthesia Type:  general ASA Status:  3          Anesthesia Type: general    Kaye Phase I: Kaye Score: 10    Kaye Phase II:      Last vitals: Reviewed and per EMR flowsheets.        Anesthesia Post Evaluation    Patient location during evaluation: bedside  Patient participation: complete - patient participated  Level of consciousness: sleepy but conscious  Pain score: 0  Airway patency: patent  Nausea & Vomiting: no nausea and no vomiting  Complications: no  Cardiovascular status: hemodynamically stable  Respiratory status: acceptable  Hydration status: euvolemic

## 2020-06-21 ENCOUNTER — HOSPITAL ENCOUNTER (EMERGENCY)
Age: 80
Discharge: HOME | End: 2020-06-21
Attending: EMERGENCY MEDICINE
Payer: MEDICARE

## 2020-06-21 ENCOUNTER — APPOINTMENT (OUTPATIENT)
Dept: GENERAL RADIOLOGY | Age: 80
End: 2020-06-21
Payer: MEDICARE

## 2020-06-21 VITALS
OXYGEN SATURATION: 97 % | RESPIRATION RATE: 20 BRPM | HEART RATE: 74 BPM | TEMPERATURE: 98 F | DIASTOLIC BLOOD PRESSURE: 84 MMHG | BODY MASS INDEX: 16.22 KG/M2 | HEIGHT: 68 IN | WEIGHT: 107 LBS | SYSTOLIC BLOOD PRESSURE: 156 MMHG

## 2020-06-21 LAB
ALBUMIN SERPL-MCNC: 3.8 G/DL (ref 3.5–5.2)
ALP BLD-CCNC: 92 U/L (ref 40–130)
ALT SERPL-CCNC: 9 U/L (ref 5–41)
ANION GAP SERPL CALCULATED.3IONS-SCNC: 12 MMOL/L (ref 7–19)
APTT: 29.8 SEC (ref 26–36.2)
AST SERPL-CCNC: 18 U/L (ref 5–40)
BASOPHILS ABSOLUTE: 0.1 K/UL (ref 0–0.2)
BASOPHILS RELATIVE PERCENT: 1 % (ref 0–1)
BILIRUB SERPL-MCNC: 0.6 MG/DL (ref 0.2–1.2)
BUN BLDV-MCNC: 23 MG/DL (ref 8–23)
CALCIUM SERPL-MCNC: 9.4 MG/DL (ref 8.8–10.2)
CHLORIDE BLD-SCNC: 90 MMOL/L (ref 98–111)
CO2: 28 MMOL/L (ref 22–29)
CREAT SERPL-MCNC: 1.3 MG/DL (ref 0.5–1.2)
EOSINOPHILS ABSOLUTE: 0.1 K/UL (ref 0–0.6)
EOSINOPHILS RELATIVE PERCENT: 2.1 % (ref 0–5)
GFR NON-AFRICAN AMERICAN: 53
GLUCOSE BLD-MCNC: 88 MG/DL (ref 74–109)
HCT VFR BLD CALC: 35.1 % (ref 42–52)
HEMOGLOBIN: 11.7 G/DL (ref 14–18)
IMMATURE GRANULOCYTES #: 0 K/UL
INR BLD: 1.06 (ref 0.88–1.18)
LYMPHOCYTES ABSOLUTE: 0.4 K/UL (ref 1.1–4.5)
LYMPHOCYTES RELATIVE PERCENT: 6.8 % (ref 20–40)
MCH RBC QN AUTO: 32.2 PG (ref 27–31)
MCHC RBC AUTO-ENTMCNC: 33.3 G/DL (ref 33–37)
MCV RBC AUTO: 96.7 FL (ref 80–94)
MONOCYTES ABSOLUTE: 0.5 K/UL (ref 0–0.9)
MONOCYTES RELATIVE PERCENT: 9.9 % (ref 0–10)
NEUTROPHILS ABSOLUTE: 4.2 K/UL (ref 1.5–7.5)
NEUTROPHILS RELATIVE PERCENT: 79.8 % (ref 50–65)
PDW BLD-RTO: 12.8 % (ref 11.5–14.5)
PLATELET # BLD: 244 K/UL (ref 130–400)
PMV BLD AUTO: 9.2 FL (ref 9.4–12.4)
POTASSIUM SERPL-SCNC: 4.3 MMOL/L (ref 3.5–5)
PROTHROMBIN TIME: 13.7 SEC (ref 12–14.6)
RBC # BLD: 3.63 M/UL (ref 4.7–6.1)
SODIUM BLD-SCNC: 130 MMOL/L (ref 136–145)
TOTAL PROTEIN: 6.6 G/DL (ref 6.6–8.7)
WBC # BLD: 5.3 K/UL (ref 4.8–10.8)

## 2020-06-21 PROCEDURE — 85025 COMPLETE CBC W/AUTO DIFF WBC: CPT

## 2020-06-21 PROCEDURE — 99283 EMERGENCY DEPT VISIT LOW MDM: CPT

## 2020-06-21 PROCEDURE — 36415 COLL VENOUS BLD VENIPUNCTURE: CPT

## 2020-06-21 PROCEDURE — 74018 RADEX ABDOMEN 1 VIEW: CPT

## 2020-06-21 PROCEDURE — 85610 PROTHROMBIN TIME: CPT

## 2020-06-21 PROCEDURE — 80053 COMPREHEN METABOLIC PANEL: CPT

## 2020-06-21 PROCEDURE — 85730 THROMBOPLASTIN TIME PARTIAL: CPT

## 2020-06-21 ASSESSMENT — ENCOUNTER SYMPTOMS
ABDOMINAL PAIN: 1
RHINORRHEA: 0
DIARRHEA: 0
COUGH: 0
NAUSEA: 0
SHORTNESS OF BREATH: 0
SORE THROAT: 0
VOMITING: 0

## 2020-06-21 ASSESSMENT — PAIN DESCRIPTION - LOCATION: LOCATION: ABDOMEN

## 2020-06-21 ASSESSMENT — PAIN SCALES - GENERAL: PAINLEVEL_OUTOF10: 3

## 2020-06-21 ASSESSMENT — PAIN DESCRIPTION - DESCRIPTORS: DESCRIPTORS: CONSTANT

## 2020-06-21 ASSESSMENT — PAIN DESCRIPTION - ORIENTATION: ORIENTATION: LEFT;LOWER

## 2020-06-21 NOTE — ED PROVIDER NOTES
History Narrative    None       SCREENINGS             PHYSICAL EXAM    (up to 7 for level 4, 8 or more for level 5)     ED Triage Vitals [06/21/20 0005]   BP Temp Temp Source Pulse Resp SpO2 Height Weight   (!) 156/84 98 °F (36.7 °C) Oral 74 20 97 % 5' 8\" (1.727 m) 107 lb (48.5 kg)       Physical Exam  Vitals signs and nursing note reviewed. Constitutional:       General: He is not in acute distress. Appearance: Normal appearance. He is well-developed. He is not ill-appearing, toxic-appearing or diaphoretic. HENT:      Head: Normocephalic and atraumatic. Eyes:      Conjunctiva/sclera: Conjunctivae normal.   Neck:      Musculoskeletal: Normal range of motion and neck supple. Trachea: No tracheal deviation. Cardiovascular:      Rate and Rhythm: Normal rate and regular rhythm. Heart sounds: Normal heart sounds. No murmur. Pulmonary:      Breath sounds: Normal breath sounds. No wheezing or rales. Abdominal:      General: Abdomen is flat. Palpations: Abdomen is soft. There is no mass. Tenderness: There is abdominal tenderness. There is no guarding or rebound. Comments: No bleeding around site, minimal dried blood, mild surrounding tenderness, not peritoneal   Musculoskeletal: Normal range of motion. Skin:     General: Skin is warm and dry. Neurological:      Mental Status: He is alert and oriented to person, place, and time.          DIAGNOSTIC RESULTS         RADIOLOGY:  Non-plain film images such as CT, Ultrasound and MRI are read by the radiologist. Plain radiographic images are visualized and preliminarily interpreted bythe emergency physician with the below findings:        XR ABDOMEN (KUB) (SINGLE AP VIEW)    (Results Pending)           LABS:  Labs Reviewed   CBC WITH AUTO DIFFERENTIAL - Abnormal; Notable for the following components:       Result Value    RBC 3.63 (*)     Hemoglobin 11.7 (*)     Hematocrit 35.1 (*)     MCV 96.7 (*)     MCH 32.2 (*)     MPV 9.2 (*) Neutrophils % 79.8 (*)     Lymphocytes % 6.8 (*)     Lymphocytes Absolute 0.4 (*)     All other components within normal limits   COMPREHENSIVE METABOLIC PANEL - Abnormal; Notable for the following components:    Sodium 130 (*)     Chloride 90 (*)     CREATININE 1.3 (*)     GFR Non- 53 (*)     All other components within normal limits   APTT   PROTIME-INR       All other labs were within normal range or not returned as of this dictation. EMERGENCY DEPARTMENT COURSE and DIFFERENTIAL DIAGNOSIS/MDM:   Vitals:    Vitals:    06/21/20 0005   BP: (!) 156/84   Pulse: 74   Resp: 20   Temp: 98 °F (36.7 °C)   TempSrc: Oral   SpO2: 97%   Weight: 107 lb (48.5 kg)   Height: 5' 8\" (1.727 m)       MDM  Number of Diagnoses or Management Options     Amount and/or Complexity of Data Reviewed  Clinical lab tests: ordered and reviewed  Tests in the radiology section of CPT®: ordered and reviewed  Independent visualization of images, tracings, or specimens: yes      g tube placed yesterday, Has small amt of dried blood around g tube site, no evidence of active bleeding, labs reassuring, KUB confirms proper placement, stable for DC and outpt follow up      CONSULTS:  None    PROCEDURES:  Unless otherwise noted below, none     Procedures    FINAL IMPRESSION      1.  Complication of gastrostomy tube [facility])          DISPOSITION/PLAN   DISPOSITION Decision To Discharge 06/21/2020 01:10:48 AM      PATIENT REFERRED TO:  Phillip Muller MD  [facility] [address]  [address]  [phone]    Schedule an appointment as soon as possible for a visit in 1 week        DISCHARGE MEDICATIONS:  Discharge Medication List as of 6/21/2020  1:11 AM             (Please note that portions of this note were completed with a voice recognition program.  Efforts were made to edit thedictations but occasionally words are mis-transcribed.)    Karma Farrell MD (electronically signed)  Attending Emergency Physician

## 2020-06-21 NOTE — ED NOTES
Patient placed in a gownPatient placed on cardiac monitor, continuous pulse oximeter, and NIBP monitor. Monitor alarms on.        Nalini Carney RN  06/21/20 8855

## 2020-06-22 ENCOUNTER — HOSPITAL ENCOUNTER (OUTPATIENT)
Dept: RADIATION ONCOLOGY | Facility: HOSPITAL | Age: 80
Setting detail: RADIATION/ONCOLOGY SERIES
Discharge: HOME | End: 2020-06-22

## 2020-06-22 ENCOUNTER — CARE COORDINATION (OUTPATIENT)
Dept: CARE COORDINATION | Age: 80
End: 2020-06-22

## 2020-06-22 PROCEDURE — 77417 THER RADIOLOGY PORT IMAGE(S): CPT | Performed by: RADIOLOGY

## 2020-06-22 PROCEDURE — 77412 RADIATION TX DELIVERY LVL 3: CPT | Performed by: RADIOLOGY

## 2020-06-22 NOTE — CARE COORDINATION
Patient contacted regarding recent discharge and COVID-19 risk. Care Transition Nurse/ Ambulatory Care Manager contacted the family by telephone to perform post discharge assessment. Verified name and  with family as identifiers. Patient has following risk factors of: diabetes. CTN/ACM reviewed discharge instructions, medical action plan and red flags related to discharge diagnosis. Reviewed and educated them on any new and changed medications related to discharge diagnosis. Advised obtaining a 90-day supply of all daily and as-needed medications. Education provided regarding infection prevention, and signs and symptoms of COVID-19 and when to seek medical attention with family who verbalized understanding. Discussed exposure protocols and quarantine from [address] you at higher risk for severe illness  and given an opportunity for questions and concerns. The family agrees to contact the COVID-19 hotline [phone] or PCP office for questions related to their healthcare. CTN/ACM provided contact information for future reference. From CDC: Are you at higher risk for severe illness?  Wash your hands often.  Avoid close contact (6 feet, which is about two arm lengths) with people who are sick.  Put distance between yourself and other people if COVID-19 is spreading in your community.  Clean and disinfect frequently touched surfaces.  Avoid all cruise travel and non-essential air travel.  Call your healthcare professional if you have concerns about COVID-19 and your underlying condition or if you are sick. For more information on steps you can take to protect yourself, see CDC's How to [place] for follow-up call in 7-14 days based on severity of symptoms and risk factors. Patient's daughter states that patient continues with bleeding around G-tube. Patient has no issues with tube feeding other than blood around site.    Daughter has called PCPs

## 2020-06-23 ENCOUNTER — TELEPHONE (OUTPATIENT)
Dept: GASTROENTEROLOGY | Age: 80
End: 2020-06-23

## 2020-06-23 ENCOUNTER — HOSPITAL ENCOUNTER (OUTPATIENT)
Dept: RADIATION ONCOLOGY | Facility: HOSPITAL | Age: 80
Setting detail: RADIATION/ONCOLOGY SERIES
Discharge: HOME | End: 2020-06-23

## 2020-06-23 PROCEDURE — 77336 RADIATION PHYSICS CONSULT: CPT | Performed by: RADIOLOGY

## 2020-06-23 PROCEDURE — 77412 RADIATION TX DELIVERY LVL 3: CPT | Performed by: RADIOLOGY

## 2020-06-24 ENCOUNTER — OFFICE VISIT (OUTPATIENT)
Dept: HEMATOLOGY | Age: 80
End: 2020-06-24
Payer: MEDICARE

## 2020-06-24 ENCOUNTER — HOSPITAL ENCOUNTER (OUTPATIENT)
Dept: INFUSION THERAPY | Age: 80
Discharge: HOME | End: 2020-06-24
Payer: MEDICARE

## 2020-06-24 VITALS
TEMPERATURE: 98.8 F | DIASTOLIC BLOOD PRESSURE: 78 MMHG | SYSTOLIC BLOOD PRESSURE: 140 MMHG | OXYGEN SATURATION: 96 % | BODY MASS INDEX: 15.81 KG/M2 | WEIGHT: 104 LBS | RESPIRATION RATE: 18 BRPM | HEART RATE: 78 BPM

## 2020-06-24 DIAGNOSIS — R53.83 OTHER FATIGUE: ICD-10-CM

## 2020-06-24 DIAGNOSIS — R91.8 LUNG MASS: ICD-10-CM

## 2020-06-24 DIAGNOSIS — C80.1 SMALL CELL CARCINOMA (HCC): Primary | ICD-10-CM

## 2020-06-24 LAB
ALBUMIN SERPL-MCNC: 4 G/DL (ref 3.5–5.2)
ALP BLD-CCNC: 93 U/L (ref 40–130)
ALT SERPL-CCNC: 13 U/L (ref 21–72)
ANION GAP SERPL CALCULATED.3IONS-SCNC: 8 MMOL/L (ref 7–19)
AST SERPL-CCNC: 38 U/L (ref 17–59)
BASOPHILS ABSOLUTE: 0.02 K/UL (ref 0.01–0.08)
BASOPHILS RELATIVE PERCENT: 0.3 % (ref 0.1–1.2)
BILIRUB SERPL-MCNC: 1 MG/DL (ref 0.2–1.3)
BUN BLDV-MCNC: 26 MG/DL (ref 9–20)
CALCIUM SERPL-MCNC: 9.1 MG/DL (ref 8.4–10.2)
CHLORIDE BLD-SCNC: 91 MMOL/L (ref 98–111)
CO2: 34 MMOL/L (ref 22–29)
CREAT SERPL-MCNC: 1.1 MG/DL (ref 0.6–1.2)
EOSINOPHILS ABSOLUTE: 0.16 K/UL (ref 0.04–0.54)
EOSINOPHILS RELATIVE PERCENT: 2.3 % (ref 0.7–7)
GFR NON-AFRICAN AMERICAN: >60
GLOBULIN: 3.1 G/DL
GLUCOSE BLD-MCNC: 111 MG/DL (ref 74–106)
HCT VFR BLD CALC: 36.6 % (ref 40.1–51)
HEMOGLOBIN: 12.3 G/DL (ref 13.7–17.5)
LYMPHOCYTES ABSOLUTE: 0.27 K/UL (ref 1.18–3.74)
LYMPHOCYTES RELATIVE PERCENT: 3.8 % (ref 19.3–53.1)
MCH RBC QN AUTO: 32.5 PG (ref 25.7–32.2)
MCHC RBC AUTO-ENTMCNC: 33.6 G/DL (ref 32.3–36.5)
MCV RBC AUTO: 96.6 FL (ref 79–92.2)
MONOCYTES ABSOLUTE: 0.56 K/UL (ref 0.24–0.82)
MONOCYTES RELATIVE PERCENT: 7.9 % (ref 4.7–12.5)
NEUTROPHILS ABSOLUTE: 6.08 K/UL (ref 1.56–6.13)
NEUTROPHILS RELATIVE PERCENT: 85.7 % (ref 34–71.1)
PDW BLD-RTO: 12.9 % (ref 11.6–14.4)
PLATELET # BLD: 196 K/UL (ref 163–337)
PMV BLD AUTO: 10.4 FL (ref 7.4–10.4)
POTASSIUM SERPL-SCNC: 4.9 MMOL/L (ref 3.5–5.1)
RBC # BLD: 3.79 M/UL (ref 4.63–6.08)
SODIUM BLD-SCNC: 133 MMOL/L (ref 137–145)
TOTAL PROTEIN: 7.1 G/DL (ref 6.3–8.2)
WBC # BLD: 7.09 K/UL (ref 4.23–9.07)

## 2020-06-24 PROCEDURE — 6360000002 HC RX W HCPCS: Performed by: INTERNAL MEDICINE

## 2020-06-24 PROCEDURE — 1123F ACP DISCUSS/DSCN MKR DOCD: CPT | Performed by: INTERNAL MEDICINE

## 2020-06-24 PROCEDURE — 99214 OFFICE O/P EST MOD 30 MIN: CPT | Performed by: INTERNAL MEDICINE

## 2020-06-24 PROCEDURE — 85025 COMPLETE CBC W/AUTO DIFF WBC: CPT

## 2020-06-24 PROCEDURE — 80053 COMPREHEN METABOLIC PANEL: CPT

## 2020-06-24 PROCEDURE — 96417 CHEMO IV INFUS EACH ADDL SEQ: CPT

## 2020-06-24 PROCEDURE — 96367 TX/PROPH/DG ADDL SEQ IV INF: CPT

## 2020-06-24 PROCEDURE — G8419 CALC BMI OUT NRM PARAM NOF/U: HCPCS | Performed by: INTERNAL MEDICINE

## 2020-06-24 PROCEDURE — 96375 TX/PRO/DX INJ NEW DRUG ADDON: CPT

## 2020-06-24 PROCEDURE — 1036F TOBACCO NON-USER: CPT | Performed by: INTERNAL MEDICINE

## 2020-06-24 PROCEDURE — 2580000003 HC RX 258: Performed by: INTERNAL MEDICINE

## 2020-06-24 PROCEDURE — G8428 CUR MEDS NOT DOCUMENT: HCPCS | Performed by: INTERNAL MEDICINE

## 2020-06-24 PROCEDURE — 4040F PNEUMOC VAC/ADMIN/RCVD: CPT | Performed by: INTERNAL MEDICINE

## 2020-06-24 PROCEDURE — 96413 CHEMO IV INFUSION 1 HR: CPT

## 2020-06-24 RX ORDER — PALONOSETRON 0.05 MG/ML
0.25 INJECTION, SOLUTION INTRAVENOUS ONCE
Status: COMPLETED | OUTPATIENT
Start: 2020-06-24 | End: 2020-06-24

## 2020-06-24 RX ORDER — HEPARIN SODIUM (PORCINE) LOCK FLUSH IV SOLN 100 UNIT/ML 100 UNIT/ML
500 SOLUTION INTRAVENOUS PRN
Status: DISCONTINUED | OUTPATIENT
Start: 2020-06-24 | End: 2020-06-25 | Stop reason: HOSPADM

## 2020-06-24 RX ORDER — DEXAMETHASONE SODIUM PHOSPHATE 10 MG/ML
10 INJECTION, SOLUTION INTRAMUSCULAR; INTRAVENOUS ONCE
Status: COMPLETED | OUTPATIENT
Start: 2020-06-24 | End: 2020-06-24

## 2020-06-24 RX ORDER — ONDANSETRON 8 MG/1
8 TABLET, ORALLY DISINTEGRATING ORAL EVERY 8 HOURS PRN
Qty: 90 TABLET | Refills: 2 | Status: SHIPPED | OUTPATIENT
Start: 2020-06-24 | End: 2020-09-22

## 2020-06-24 RX ORDER — SODIUM CHLORIDE 0.9 % (FLUSH) 0.9 %
10 SYRINGE (ML) INJECTION PRN
Status: DISCONTINUED | OUTPATIENT
Start: 2020-06-24 | End: 2020-06-25 | Stop reason: HOSPADM

## 2020-06-24 RX ADMIN — FOSAPREPITANT 150 MG: 150 INJECTION, POWDER, LYOPHILIZED, FOR SOLUTION INTRAVENOUS at 13:32

## 2020-06-24 RX ADMIN — ATEZOLIZUMAB 1200 MG: 1200 INJECTION, SOLUTION INTRAVENOUS at 14:03

## 2020-06-24 RX ADMIN — CARBOPLATIN 224 MG: 10 INJECTION, SOLUTION INTRAVENOUS at 15:14

## 2020-06-24 RX ADMIN — ETOPOSIDE 120 MG: 20 INJECTION, SOLUTION, CONCENTRATE INTRAVENOUS at 15:48

## 2020-06-24 RX ADMIN — PALONOSETRON 0.25 MG: 0.05 INJECTION, SOLUTION INTRAVENOUS at 13:38

## 2020-06-24 RX ADMIN — DEXAMETHASONE SODIUM PHOSPHATE 10 MG: 10 INJECTION, SOLUTION INTRAMUSCULAR; INTRAVENOUS at 13:38

## 2020-06-24 ASSESSMENT — PAIN SCALES - GENERAL: PAINLEVEL_OUTOF10: 4

## 2020-06-24 ASSESSMENT — PAIN DESCRIPTION - DESCRIPTORS: DESCRIPTORS: CONSTANT

## 2020-06-24 NOTE — PROGRESS NOTES
MEDICAL ONCOLOGY PROGRESS NOTE     Pt Name: Rhonda Lopez  MRN: [id number]  YOB: 1940  Date of evaluation: 6/24/2020    Diagnosis  · Small cell lung cancer, May 2020  · Extensive stage   · Target (Liver, lungs)    Treatment summary  · 06/24/2020- Anticipated Carboplatin AUC4, etoposide 80mg/m2 days 1-3 and Tecentriq 1200mg   · Cycle 2-6 Carboplatin AUC 5 and etoposide 100mg/m2 days 1-3 and Tecentriq q 3 weeks  · Palliative radiation mediastinal lymph nodes     Target lesions  · Left bulky hilar adenopathy  · Left supraclavicular/retrocrural adenopathy  · Liver metastasis    Interval history  Patient is a pleasant 68years old male who has been diagnosed with extensive stage small cell lung cancer. He was recommended palliative chemotherapy with carboplatin/etoposide. He presents today for cycle #1. The patient has lost a significant amount of weight. He had a PEG tube placed. He has not been able to swallow due to compressive symptoms of bulk mediastinal adenopathy. HISTORY OF PRESENT ILLNESS:    Mr. Krunal Jeff was first seen by me on 5/15/2020. He had complaints of chest pain and was referred to the emergency. CT chest showed mediastinal adenopathy and collapse of the left lower lobe with associated liver metastasis. The patient reports a 13 pound weight loss over the last few months. He was a smoker for many years but quit about 8 years ago. He denies any hemoptysis. · 5/13/2020- CT PE protocol showed no evidence of pulmonary embolus. 6 cm left hilar, infrahilar soft tissue mass favored to represent a primary lung neoplasm. Invasion into the mediastinum with encasement and narrowing of the left mainstem bronchus and near complete occlusion of the left lower lobe bronchus with resulting near complete left lower lobe collapse. Bulky mediastinal and left supraclavicular adenopathy most consistent with alfredo spread of neoplasm. In the terminated mild enlarged right hilar lymph node. Multiple liver lesions which are highly suspicious for hepatic metastasis. Small indeterminate right lung pulmonary nodules. Severe emphysema. · 5/15/2020- he was first seen by me. Recommended FNA biopsy of left supraclavicular lymph node. · 5/13/2020 CT PE protocol showed  No evidence of pulmonary embolus. 6 cm LEFT hilar/infrahilar soft tissue mass, favored to represent a primary lung neoplasm. There is invasion into the mediastinum with encasement and narrowing of the LEFT mainstem bronchus and near complete occlusion of the LEFT lower lobe bronchus with resultant near complete LEFT lower lobe collapse. Bulky mediastinal and LEFT supraclavicular adenopathy most consistent with alfredo spread of neoplasm. Indeterminate mildly enlarged RIGHT hilar lymph node. Multiple liver lesions which are highly suspicious for hepatic metastasis. Small indeterminate RIGHT lung pulmonary nodules. · 5/14/2020 Ct Head W Contrast No evidence of intracranial metastatic disease. · 5/15/2020 Nm Bone Scan Whole Body No evidence of a bony metastatic disease. · 5/15/2020- FNA biopsy left supraclavicular lymph node consistent metastatic carcinoma. Insufficient tissue for further characterization. · 5/22/2020-PET-CT scan showed  Hypermetabolic thoracic disease involving the mediastinum and hilar stations, left greater than right. Associated obstructive pneumonitis in the left lower lobe. Right lower lobe nodules, may reflect metastatic disease. Metastatic disease to the liver and right adrenal gland. Mesenteric and retroperitoneal metastatic disease as above. · 6/1/2020- L supraclavicular lymph node pathology shows small cell carcinoma infiltrating fibroadipose tissue  · 6/9/2020- Ct Soft Tissue Neck W Wo Contrast Lymph node is present in the supraclavicular region adjacent to the left side of the trachea. This is 34 mm in diameter.  Additional mediastinal adenopathy is noted superior mediastinum adjacent to the left side of medications, side effects and disease management. The method of counseling included verbal explanation. The patient ve CMP lipids are rbalized understanding. Essentially, stage IV disease with extensive stage small cell lung cancer. Treatment regimen:  06/17/2020- Anticipated cycle #1 carboplatin AUC4, etoposide 80mg/m2 days 1-3 and Tecentriq  Cycle 2-6 Carboplatin AUC 5 and etoposide 100mg/m2 days 1-3 likely palliative chemoimmunotherapy. Currently receiving palliative radiation chest wall adenopathy. Cancer related pain-. Continue fentanyl patch  Continue oxycodone  mg p.o. every 6 hours as needed. Currently receiving palliative radiation    Protein calorie malnutrition-continue Enteral nutrition. Recommended home health to help with management of enteral nutrition. Physical deconditioning-decreased mobility. Severe fatigue. Recommend home health. Recommended hospital bed. PLAN:  Proceed with chemotherapy today  RTC cycle #2  CBC, CMP weekly  CMP before each cycle. Continue fentanyl patch and as needed liquid morphine. [address] [place]    I have seen, examined and reviewed this patient medication list, appropriate labs and imaging studies. I reviewed relevant medical records and others physicians notes. I discussed the plans of care with the patient. I answered all the questions to the patients satisfaction. I have also reviewed the chief complaint (CC) and part of the history (History of Present Illness (HPI), Past Family Social History [facility]), or Review of Systems (ROS) and made changes when appropriated. (Please note that portions of this note were completed with a voice recognition program. Efforts were made to edit the dictations but occasionally words are mis-transcribed.)    I, Dr Hubert Watts, personally performed the services described in this documentation as scribed by Shital Dewey RN in my presence and is both accurate and complete.     Over 50% of the total visit time of 25 minutes in face to face encounter with the patient, out of which more than 50% of the time was spent in counseling patient or family and coordination of care. Counseling included but was not limited to time spent reviewing labs, imaging studies/ treatment plan and answering questions.      Kristal Womack MD    06/24/20  2:15 PM

## 2020-06-25 ENCOUNTER — HOSPITAL ENCOUNTER (OUTPATIENT)
Dept: INFUSION THERAPY | Age: 80
Discharge: HOME | End: 2020-06-25
Payer: MEDICARE

## 2020-06-25 DIAGNOSIS — R53.83 OTHER FATIGUE: ICD-10-CM

## 2020-06-25 DIAGNOSIS — C80.1 SMALL CELL CARCINOMA (HCC): Primary | ICD-10-CM

## 2020-06-25 PROCEDURE — 96367 TX/PROPH/DG ADDL SEQ IV INF: CPT

## 2020-06-25 PROCEDURE — 96413 CHEMO IV INFUSION 1 HR: CPT

## 2020-06-25 PROCEDURE — 6360000002 HC RX W HCPCS: Performed by: INTERNAL MEDICINE

## 2020-06-25 PROCEDURE — 96360 HYDRATION IV INFUSION INIT: CPT

## 2020-06-25 PROCEDURE — 96366 THER/PROPH/DIAG IV INF ADDON: CPT

## 2020-06-25 PROCEDURE — 2580000003 HC RX 258: Performed by: INTERNAL MEDICINE

## 2020-06-25 RX ORDER — HEPARIN SODIUM (PORCINE) LOCK FLUSH IV SOLN 100 UNIT/ML 100 UNIT/ML
500 SOLUTION INTRAVENOUS PRN
Status: DISCONTINUED | OUTPATIENT
Start: 2020-06-25 | End: 2020-06-26 | Stop reason: HOSPADM

## 2020-06-25 RX ORDER — ONDANSETRON 2 MG/ML
16 INJECTION INTRAMUSCULAR; INTRAVENOUS ONCE
Status: DISCONTINUED | OUTPATIENT
Start: 2020-06-25 | End: 2020-06-25

## 2020-06-25 RX ORDER — SODIUM CHLORIDE 0.9 % (FLUSH) 0.9 %
10 SYRINGE (ML) INJECTION PRN
Status: DISCONTINUED | OUTPATIENT
Start: 2020-06-25 | End: 2020-06-26 | Stop reason: HOSPADM

## 2020-06-25 RX ORDER — DEXAMETHASONE SODIUM PHOSPHATE 10 MG/ML
10 INJECTION, SOLUTION INTRAMUSCULAR; INTRAVENOUS ONCE
Status: DISCONTINUED | OUTPATIENT
Start: 2020-06-25 | End: 2020-06-25

## 2020-06-25 RX ADMIN — DEXAMETHASONE SODIUM PHOSPHATE: 10 INJECTION, SOLUTION INTRAMUSCULAR; INTRAVENOUS at 12:45

## 2020-06-26 ENCOUNTER — APPOINTMENT (OUTPATIENT)
Dept: GENERAL RADIOLOGY | Age: 80
End: 2020-06-26
Payer: MEDICARE

## 2020-06-26 ENCOUNTER — HOSPITAL ENCOUNTER (EMERGENCY)
Age: 80
Discharge: HOME | End: 2020-06-27
Attending: PEDIATRICS
Payer: MEDICARE

## 2020-06-26 ENCOUNTER — HOSPITAL ENCOUNTER (OUTPATIENT)
Dept: INFUSION THERAPY | Age: 80
Discharge: HOME | End: 2020-06-26
Payer: MEDICARE

## 2020-06-26 VITALS
OXYGEN SATURATION: 99 % | SYSTOLIC BLOOD PRESSURE: 140 MMHG | RESPIRATION RATE: 17 BRPM | DIASTOLIC BLOOD PRESSURE: 74 MMHG | BODY MASS INDEX: 15.81 KG/M2 | WEIGHT: 104 LBS | TEMPERATURE: 97.8 F | HEART RATE: 64 BPM

## 2020-06-26 VITALS
BODY MASS INDEX: 15.81 KG/M2 | HEART RATE: 54 BPM | HEIGHT: 68 IN | OXYGEN SATURATION: 96 % | SYSTOLIC BLOOD PRESSURE: 108 MMHG | DIASTOLIC BLOOD PRESSURE: 60 MMHG | TEMPERATURE: 97.7 F

## 2020-06-26 DIAGNOSIS — R53.83 OTHER FATIGUE: ICD-10-CM

## 2020-06-26 DIAGNOSIS — C80.1 SMALL CELL CARCINOMA (HCC): Primary | ICD-10-CM

## 2020-06-26 LAB
ALBUMIN SERPL-MCNC: 3.8 G/DL (ref 3.5–5.2)
ALP BLD-CCNC: 75 U/L (ref 40–130)
ALT SERPL-CCNC: 11 U/L (ref 5–41)
ANION GAP SERPL CALCULATED.3IONS-SCNC: 12 MMOL/L (ref 7–19)
AST SERPL-CCNC: 21 U/L (ref 5–40)
BASOPHILS ABSOLUTE: 0 K/UL (ref 0–0.2)
BASOPHILS RELATIVE PERCENT: 0 % (ref 0–1)
BILIRUB SERPL-MCNC: 0.4 MG/DL (ref 0.2–1.2)
BUN BLDV-MCNC: 34 MG/DL (ref 8–23)
CALCIUM SERPL-MCNC: 8.8 MG/DL (ref 8.8–10.2)
CHLORIDE BLD-SCNC: 95 MMOL/L (ref 98–111)
CO2: 26 MMOL/L (ref 22–29)
CREAT SERPL-MCNC: 1.1 MG/DL (ref 0.5–1.2)
EOSINOPHILS ABSOLUTE: 0 K/UL (ref 0–0.6)
EOSINOPHILS RELATIVE PERCENT: 0.1 % (ref 0–5)
GFR NON-AFRICAN AMERICAN: >60
GLUCOSE BLD-MCNC: 153 MG/DL (ref 74–109)
HCT VFR BLD CALC: 36.1 % (ref 42–52)
HEMOGLOBIN: 12.1 G/DL (ref 14–18)
IMMATURE GRANULOCYTES #: 0.1 K/UL
LYMPHOCYTES ABSOLUTE: 0.1 K/UL (ref 1.1–4.5)
LYMPHOCYTES RELATIVE PERCENT: 0.8 % (ref 20–40)
MCH RBC QN AUTO: 32.5 PG (ref 27–31)
MCHC RBC AUTO-ENTMCNC: 33.5 G/DL (ref 33–37)
MCV RBC AUTO: 97 FL (ref 80–94)
MONOCYTES ABSOLUTE: 0.1 K/UL (ref 0–0.9)
MONOCYTES RELATIVE PERCENT: 1.3 % (ref 0–10)
NEUTROPHILS ABSOLUTE: 9.5 K/UL (ref 1.5–7.5)
NEUTROPHILS RELATIVE PERCENT: 96.9 % (ref 50–65)
PDW BLD-RTO: 13.2 % (ref 11.5–14.5)
PLATELET # BLD: 175 K/UL (ref 130–400)
PMV BLD AUTO: 10.2 FL (ref 9.4–12.4)
POTASSIUM SERPL-SCNC: 4.7 MMOL/L (ref 3.5–5)
PRO-BNP: 1102 PG/ML (ref 0–1800)
RBC # BLD: 3.72 M/UL (ref 4.7–6.1)
SODIUM BLD-SCNC: 133 MMOL/L (ref 136–145)
TOTAL PROTEIN: 5.8 G/DL (ref 6.6–8.7)
WBC # BLD: 9.8 K/UL (ref 4.8–10.8)

## 2020-06-26 PROCEDURE — 96413 CHEMO IV INFUSION 1 HR: CPT

## 2020-06-26 PROCEDURE — 2580000003 HC RX 258: Performed by: INTERNAL MEDICINE

## 2020-06-26 PROCEDURE — 2580000003 HC RX 258: Performed by: PEDIATRICS

## 2020-06-26 PROCEDURE — 96367 TX/PROPH/DG ADDL SEQ IV INF: CPT

## 2020-06-26 PROCEDURE — 36415 COLL VENOUS BLD VENIPUNCTURE: CPT

## 2020-06-26 PROCEDURE — 96368 THER/DIAG CONCURRENT INF: CPT

## 2020-06-26 PROCEDURE — 6360000002 HC RX W HCPCS: Performed by: INTERNAL MEDICINE

## 2020-06-26 PROCEDURE — 80053 COMPREHEN METABOLIC PANEL: CPT

## 2020-06-26 PROCEDURE — 6360000002 HC RX W HCPCS: Performed by: PEDIATRICS

## 2020-06-26 PROCEDURE — 83880 ASSAY OF NATRIURETIC PEPTIDE: CPT

## 2020-06-26 PROCEDURE — 71045 X-RAY EXAM CHEST 1 VIEW: CPT

## 2020-06-26 PROCEDURE — 85025 COMPLETE CBC W/AUTO DIFF WBC: CPT

## 2020-06-26 PROCEDURE — 99285 EMERGENCY DEPT VISIT HI MDM: CPT

## 2020-06-26 PROCEDURE — 96374 THER/PROPH/DIAG INJ IV PUSH: CPT

## 2020-06-26 RX ORDER — HEPARIN SODIUM (PORCINE) LOCK FLUSH IV SOLN 100 UNIT/ML 100 UNIT/ML
500 SOLUTION INTRAVENOUS PRN
Status: DISCONTINUED | OUTPATIENT
Start: 2020-06-26 | End: 2020-06-27 | Stop reason: HOSPADM

## 2020-06-26 RX ORDER — ONDANSETRON 2 MG/ML
16 INJECTION INTRAMUSCULAR; INTRAVENOUS ONCE
Status: DISCONTINUED | OUTPATIENT
Start: 2020-06-26 | End: 2020-06-26

## 2020-06-26 RX ORDER — 0.9 % SODIUM CHLORIDE 0.9 %
1000 INTRAVENOUS SOLUTION INTRAVENOUS ONCE
Status: COMPLETED | OUTPATIENT
Start: 2020-06-26 | End: 2020-06-27

## 2020-06-26 RX ORDER — SODIUM CHLORIDE 0.9 % (FLUSH) 0.9 %
10 SYRINGE (ML) INJECTION PRN
Status: DISCONTINUED | OUTPATIENT
Start: 2020-06-26 | End: 2020-06-27 | Stop reason: HOSPADM

## 2020-06-26 RX ORDER — DEXAMETHASONE SODIUM PHOSPHATE 10 MG/ML
8 INJECTION, SOLUTION INTRAMUSCULAR; INTRAVENOUS ONCE
Status: DISCONTINUED | OUTPATIENT
Start: 2020-06-26 | End: 2020-06-26

## 2020-06-26 RX ORDER — MORPHINE SULFATE 4 MG/ML
4 INJECTION, SOLUTION INTRAMUSCULAR; INTRAVENOUS ONCE
Status: COMPLETED | OUTPATIENT
Start: 2020-06-26 | End: 2020-06-26

## 2020-06-26 RX ADMIN — SODIUM CHLORIDE 1000 ML: 9 INJECTION, SOLUTION INTRAVENOUS at 23:22

## 2020-06-26 RX ADMIN — ETOPOSIDE 128 MG: 20 INJECTION, SOLUTION, CONCENTRATE INTRAVENOUS at 12:56

## 2020-06-26 RX ADMIN — MORPHINE SULFATE 4 MG: 4 INJECTION INTRAVENOUS at 21:30

## 2020-06-26 RX ADMIN — DEXAMETHASONE SODIUM PHOSPHATE: 10 INJECTION, SOLUTION INTRAMUSCULAR; INTRAVENOUS at 12:22

## 2020-06-26 RX ADMIN — ETOPOSIDE 128 MG: 20 INJECTION, SOLUTION, CONCENTRATE INTRAVENOUS at 12:13

## 2020-06-26 ASSESSMENT — PAIN SCALES - GENERAL
PAINLEVEL_OUTOF10: 4
PAINLEVEL_OUTOF10: 5

## 2020-06-26 NOTE — ED NOTES
PT states has lung cancer with mets and is somewhat short of breath at baseline, but has significantly increased in the last hour.       Jerilyn Escobedo RN  06/26/20 5545

## 2020-06-27 NOTE — ED PROVIDER NOTES
[facility] EMERGENCY DEPT  eMERGENCY dEPARTMENT eNCOUnter      Pt Name: Magdaleno Rosario  MRN: [id number]  [place] 1940  Date of evaluation: 6/26/2020  Provider: Meghan Mondragon MD    [address] [place] [address]       Chief Complaint   Patient presents with    Shortness of Breath         HISTORY OF PRESENT ILLNESS   (Location/Symptom, Timing/Onset,Context/Setting, Quality, Duration, Modifying Factors, Severity)  Note limiting factors. Magdaleno Rosario is a 68 y.o. male who presents to the emergency department with shortness of breath. Patient states that he has a history of lung cancer and is currently undergoing chemo and radiation. Patient states that earlier he began having shortness of breath around 6 PM.  Patient was also experiencing chest pain chest pain at that time. Patient states the pain became worse with deep breathing. Patient states the shortness of breath is \"all better now. \"  Patient states that his G-tube is hurting. Patient states that he had his G-tube placed last week and it is been uncomfortable. Patient also has a history of coronary artery disease. Patient describes the chest pain is dull in character. Patient states the chest pain has also resolved at this time. HPI    NursingNotes were reviewed. REVIEW OF SYSTEMS    (2-9 systems for level 4, 10 or more for level 5)     Review of Systems   Constitutional: Negative for chills and fever. HENT: Negative for congestion and rhinorrhea. Eyes: Negative for discharge. Respiratory: Positive for shortness of breath. Negative for cough. Cardiovascular: Positive for chest pain. Negative for palpitations. Gastrointestinal: Positive for abdominal pain (\"G-tube hurting\". ). Negative for nausea and vomiting. Genitourinary: Negative for difficulty urinating and dysuria. Musculoskeletal: Negative for back pain and neck pain. Skin: Negative for color change and pallor. Neurological: Negative for syncope and light-headedness. Psychiatric/Behavioral: Negative for agitation and decreased concentration. All other systems reviewed and are negative. PAST MEDICALHISTORY     Past Medical History:   Diagnosis Date    Arteriosclerotic heart disease (ASHD)     Arthritis     BPH (benign prostatic hyperplasia)     Cancer (HCC)     Carotid artery stenosis     DJD (degenerative joint disease)     DM (diabetes mellitus), type 2 (HCC)     HTN (hypertension)     Hypercholesteremia     Hypercholesterolemia     Polyosteoarthritis     PVD (peripheral vascular disease) ([facility] [address]          SURGICAL HISTORY       Past Surgical History:   Procedure Laterality Date    CARDIAC CATHETERIZATION  09/2005    CAROTID ENDARTERECTOMY      CATARACT REMOVAL WITH IMPLANT      CHOLECYSTECTOMY, LAPAROSCOPIC  04/22/2011    with oversew of preforated peptic ulcer with joel patch closure     COLONOSCOPY  appprox 2016    Meera: \" normal\" per pt recall    COLONOSCOPY  07/25/2005    Dr Vandana Riojas x 1, HP x 2, 2 yr recall    CORONARY ARTERY BYPASS GRAFT      GASTROSTOMY TUBE PLACEMENT  06/19/2020    Dr Adry Coy ENDOSCOPY N/A 6/19/2020    EGD ESOPHAGOGASTRODUODENOSCOPY performed by Mildred Reed MD at [address] [facility]    UPPER GASTROINTESTINAL ENDOSCOPY N/A 6/19/2020    EGD ESOPHAGOGASTRODUODENOSCOPY PEG TUBE INSERTION performed [facility] [address] [facility]         CURRENT MEDICATIONS     Discharge Medication List as of 6/26/2020 11:20 PM      CONTINUE these medications which have NOT CHANGED    Details   ondansetron (ZOFRAN ODT) 8 MG TBDP disintegrating tablet Place 1 tablet under the tongue every 8 hours as needed for Nausea or Vomiting, Disp-90 tablet, R-2Normal      fentaNYL (DURAGESIC) 50 MCG/HR every 72 hours. Historical Med      promethazine (PHENERGAN) 12.5 MG tablet Take 1 tablet by mouth every 6 hours as needed for Nausea, Disp-90 tablet, R-1Normal      Morphine Sulfate [facility]) 15 MG TBEA Take 15 mg by mouth every 12 hours for 30 days. Intended supply: 30 days, Disp-60 each, R-0Print      cloNIDine (CATAPRES) 0.1 MG tablet Take 0.1 mg by mouth 2 times dailyHistorical Med      metFORMIN (GLUCOPHAGE) 1000 MG tablet Take 1,000 mg by mouth 2 times daily (with meals)Historical Med      albuterol sulfate  (90 Base) MCG/ACT inhaler Inhale 2 puffs into the lungs every 6 hours as needed for WheezingHistorical Med      guaiFENesin (MUCINEX) 600 MG extended release tablet Take 1,200 mg by mouth 2 times dailyHistorical Med      aspirin 81 MG tablet Take 81 mg by mouth dailyHistorical Med      metoprolol succinate (TOPROL XL) 50 MG extended release tablet Take 50 mg by mouth dailyHistorical Med      pantoprazole (PROTONIX) 40 MG tablet Take 40 mg by mouth dailyHistorical Med      lisinopril (PRINIVIL;ZESTRIL) 20 MG tablet Take 20 mg by mouth dailyHistorical Med      atorvastatin (LIPITOR) 40 MG tablet Take 40 mg by mouth dailyHistorical Med      tamsulosin (FLOMAX) 0.4 MG capsule Take 0.4 mg by mouth dailyHistorical Med             ALLERGIES     Amoxicillin-pot clavulanate    FAMILY HISTORY       Family History   Problem Relation Age of Onset    Hypotension Father     Heart Disease Brother     No Known Problems Mother     Breast Cancer Sister     Colon Cancer Neg Hx     Colon Polyps Neg Hx           SOCIAL HISTORY       Social History     Socioeconomic History    Marital status:       Spouse name: None    Number of children: None    Years of education: None    Highest education level: None   Occupational History    None   Social Needs    Financial resource strain: None    Food insecurity     Worry: None     Inability: None    Transportation needs     Medical: None     Non-medical: None   Tobacco Use    Smoking status: Former Smoker     Packs/day: 1.00     Last attempt to quit: 2019     Years since quittin.6    Smokeless tobacco: Never Used   Substance and Sexual Activity    Alcohol use: Never     Frequency: Never    Drug use: Never    Sexual activity: None   Lifestyle    Physical activity     Days per week: None     Minutes per session: None    Stress: None   Relationships    Social connections     Talks on phone: None     Gets together: None     Attends Buddhist service: None     Active member of club or organization: None     Attends meetings of clubs or organizations: None     Relationship status: None    Intimate partner violence     Fear of current or ex partner: None     Emotionally abused: None     Physically abused: None     Forced sexual activity: None   Other Topics Concern    None   Social History Narrative    None       SCREENINGS             PHYSICAL EXAM    (up to 7 for level 4, 8 or more for level 5)     ED Triage Vitals [06/26/20 1818]   BP Temp Temp src Pulse Resp SpO2 Height Weight   (!) 170/97 97.8 °F (36.6 °C) -- 96 18 94 % -- 104 lb (47.2 kg)       Physical Exam  Vitals signs and nursing note reviewed. Constitutional:       General: He is not in acute distress. Comments: Thin   HENT:      Head: Normocephalic and atraumatic. Right Ear: External ear normal.      Left Ear: External ear normal.      Nose: Nose normal.      Mouth/Throat:      Mouth: Mucous membranes are moist.      Pharynx: Oropharynx is clear. No oropharyngeal exudate. Eyes:      General: No scleral icterus. Conjunctiva/sclera: Conjunctivae normal.      Pupils: Pupils are equal, round, and reactive to light. Neck:      Musculoskeletal: Neck supple. No neck rigidity. Cardiovascular:      Rate and Rhythm: Normal rate and regular rhythm. Pulses: Normal pulses. Heart sounds: Normal heart sounds. Pulmonary:      Effort: Pulmonary effort is normal.      Comments: Decreased breath sounds left side. Abdominal:      General: Bowel sounds are normal.      Palpations: Abdomen is soft. Tenderness: There is no abdominal tenderness. There is no guarding. Comments: G-tube site is clean without erythema or discharge. There is no pain with palpation. Musculoskeletal:         General: No tenderness or deformity. Skin:     General: Skin is warm and dry. Capillary Refill: Capillary refill takes less than 2 seconds. Coloration: Skin is not jaundiced. Neurological:      General: No focal deficit present. Mental Status: He is alert and oriented to person, place, and time. Mental status is at baseline. Coordination: Coordination normal.   Psychiatric:         Mood and Affect: Mood normal.         Behavior: Behavior normal.         DIAGNOSTIC RESULTS     EKG: All EKG's areinterpreted by the Emergency Department Physician who either signs or Co-signs this chart in the absence of a cardiologist.  EKG dated 6/26/2020 at 2050 5 PM: Normal sinus rhythm, rate 67. T wave inversions in V2 and V3. Artifact present. Hyperacute T waves in 2, 3, and aVF as well as V4 and V5. No comparison available. RADIOLOGY:  Non-plain film images such as CT, Ultrasound and MRI are read by the radiologist. Plain radiographic images are visualized and preliminarily interpreted bythe emergency physician with the below findings:    XR CHEST PORTABLE   Final Result   Chronic changes with hyperinflation. No evidence of acute   cardiopulmonary process.    Signed by Dr Jeanmarie Mojica on 6/26/2020 7:33 PM              LABS:  Labs Reviewed   CBC WITH AUTO DIFFERENTIAL - Abnormal; Notable for the following components:       Result Value    RBC 3.72 (*)     Hemoglobin 12.1 (*)     Hematocrit 36.1 (*)     MCV 97.0 (*)     MCH 32.5 (*)     Neutrophils % 96.9 (*)     Lymphocytes % 0.8 (*)     Neutrophils Absolute 9.5 (*)     Lymphocytes Absolute 0.1 (*)     All other components within normal limits   COMPREHENSIVE METABOLIC PANEL - Abnormal; Notable for the following components:    Sodium 133 (*)     Chloride 95 (*)     Glucose 153 (*)     BUN 34 (*)     Total Protein 5.8 (*) All other components within normal limits   BRAIN NATRIURETIC PEPTIDE       All other labs were within normal range or not returned as of this dictation. EMERGENCY DEPARTMENT COURSE and DIFFERENTIAL DIAGNOSIS/MDM:   Vitals:    Vitals:    06/26/20 2032 06/26/20 2131 06/26/20 2201 06/26/20 2232   BP: 138/69 (!) 158/84 (!) 145/73 (!) 140/74   Pulse: 65 63 67 64   Resp: 20 14 15 17   Temp:       SpO2: 100% 100% 99% 99%   Weight:           MDM    81-year-old male with history of lung cancer presents with chest pain and shortness of breath that have since resolved. Lab, EKG, and radiology results reviewed. Patient will be discharged home to follow-up with his primary care provider, Dr. Loc Gould patient will return with increasing or severe pain, difficulty breathing, or other concerns. .      CONSULTS:  None    PROCEDURES:  Unless otherwise noted below, none     Procedures    FINAL IMPRESSION      1.  Malignant neoplasm of lung, unspecified laterality, unspecified part of lung ([facility] [address]    2. Pain from gastrostomy tube [facility])          DISPOSITION/PLAN   DISPOSITION Decision To Discharge 06/27/2020 12:28:32 AM      PATIENT REFERRED TO:  Susie Torres MD  [address] [phone]    Schedule an appointment as soon as possible for a visit         DISCHARGE MEDICATIONS:  Discharge Medication List as of 6/26/2020 11:20 PM             (Please note that portions of this note were completed with a voice recognition program.  Efforts were made to edit thedictations but occasionally words are mis-transcribed.)    Fara Austin MD (electronically signed)  Attending Emergency Physician          Fara Austin MD  06/28/20 [id number]

## 2020-06-27 NOTE — ED NOTES
Pt currently being palliative treated with chemo and radiation for lung ca with mets. Daughter notes pt has an enlarged left sided neck lymph node that is unable to be surgically removed due to location. Having issues with swelling to the neck,      Pt notes pain to his G tube and sob depending on his position. Pain to PEG tube is improved if pt is laying on his back or his side.   Pt notes that he has some chest discomfort, daughter also notes that he has a lung mass on the left that they are treating with radiation to help with SOB     Isrrael Munoz RN  06/26/20 1946

## 2020-06-28 ASSESSMENT — ENCOUNTER SYMPTOMS
ABDOMINAL PAIN: 1
BACK PAIN: 0
COUGH: 0
EYE DISCHARGE: 0
RHINORRHEA: 0
COLOR CHANGE: 0
VOMITING: 0
SHORTNESS OF BREATH: 1
NAUSEA: 0

## 2020-06-29 ENCOUNTER — TELEPHONE (OUTPATIENT)
Dept: INFUSION THERAPY | Age: 80
End: 2020-06-29

## 2020-06-29 ENCOUNTER — CARE COORDINATION (OUTPATIENT)
Dept: CARE COORDINATION | Age: 80
End: 2020-06-29

## 2020-07-01 ENCOUNTER — HOSPITAL ENCOUNTER (OUTPATIENT)
Dept: RADIATION ONCOLOGY | Facility: HOSPITAL | Age: 80
Setting detail: RADIATION/ONCOLOGY SERIES
End: 2020-07-01

## 2020-07-15 ENCOUNTER — HOSPITAL ENCOUNTER (OUTPATIENT)
Dept: INFUSION THERAPY | Age: 80
End: 2020-07-15
Payer: MEDICARE

## 2020-07-16 ENCOUNTER — HOSPITAL ENCOUNTER (OUTPATIENT)
Dept: INFUSION THERAPY | Age: 80
End: 2020-07-16
Payer: MEDICARE

## 2020-09-30 PROBLEM — C34.32 MALIGNANT NEOPLASM OF LOWER LOBE OF LEFT LUNG (HCC): Status: RESOLVED | Noted: 2020-05-28 | Resolved: 2020-09-30

## 2022-04-25 NOTE — PROGRESS NOTES
YOB: 1940  Location: [facility]  Location Address: [address] [place] [address], [facility], [address] [place] [address]  Location Phone: [phone]    Chief Complaint   Patient presents with   • Swollen Glands       History of Present Illness  Tata Ruiz is a 80 y.o. male.  Tata Ruiz is here for evaluation of ENT complaints. The patient has had problems with lymphadenopathy   The symptoms are localized to the left medial supraclavicular area. The patient has had moderate to severe symptoms. The symptoms have been present for the last several months The symptoms are aggravated by  no identifiable factors. The symptoms are improved by no identifiable factors.    The patient had originally presented to [facility] with chest pain and was found to have a lung mass. This has been worked up with multiple images and PET scan that showed a left medial supraclavicular lymph node with an uptake of 17. An FNA was performed on this, but did not get sufficient cells for diagnosis other than knowing it is a metastatic cancer. The patient is here today to be set up for an excisional biopsy of the node.                       Care Everywhere Result Report  Cytology, Non-GynResulted: 2020 6:38 PM  [place], [place]  Result Narrative                                        [facility]                                       [address] [place] [address]  Department of Pathology  FINAL CYTOLOGY REPORT  Patient Name:  TATA RUIZ           Accession No:  [id number]   Age Sex:   1940   80 Y M          Pt Type: I     DIS01                                              Location:  Account No:    [id number]                  Collected:     05/15/2020  Med Rec No:    [id number]                     Received:      05/15/2020  Attend Phys:   DIGNA LOAIZA                 Completed:     2020  Perform Phys:  DIGNA LOAIZA            FINAL DIAGNOSIS:    Lymph  node, left supraclavicular lymph node fine-needle aspiration,  smears, ThinPrep and cell block: Metastatic carcinoma.    COMMENT:   Insufficient tissue is present for further characterization.   CBG/CBG    CLINICAL INFORMATION:    SPECIMEN:  FNA LYMPH NODE, LEFT SUPRACLAVICULAR LYMPH NODE  1 Cell Blocks  1 Monolayers  8 Smears  Total Slides: 12  Volume: 30 mL colorless, clear, CYTOLYT           /    IMMEDIATE EVALUATION:    Lymph node, left supraclavicular lymph node fine-needle aspiration:  Atypical cells present.   Pass #1: Blood and lymphocytes.   Pass #2: Rare clusters of atypical cells.   Pass #3: Rare clusters of atypical cells.   Pass #4: Scant blood.         CBG  MICROSCOPIC DESCRIPTION: Microscopic examination of a ThinPrep, cell  block and smear preparations of a left supraclavicular lymph node  fine-needle aspiration specimen reveals occasional small round  lymphocytes admixed with necrotic debris and very rare clusters of  epithelioid cells with increased nuclear to cytoplasmic ratios and mildly  pleomorphic oval to round nuclei with small nucleoli.  After reviewing  the H&E and Pap stained slides, and to better evaluate the atypical  cells, immunohistochemical stains for CK 5/6, TTF-1, leukocyte common  antigen and Synaptophysin are performed using appropriately staining  controls.  The stains are all noncontributory due to insufficient tumor.      CPT: 88305 X1   88172 X1   88173 X1   88177 X1          MAXI JOHANSEN MD                                                                    (Electro  ofelia Signature)  05/18/2020                                                                     Page 1 of 1         Past Medical History:   Diagnosis Date   • Diabetes (CMS/HCC)    • Enlarged prostate    • GERD (gastroesophageal reflux disease)    • Hypertension    • Lung cancer (CMS/HCC)        Past Surgical History:   Procedure Laterality Date   • CHOLECYSTECTOMY     • CORONARY ARTERY BYPASS GRAFT     •  STOMACH SURGERY         Outpatient Medications Marked as Taking for the 5/28/20 encounter (Office Visit) with William Garcia MD   Medication Sig Dispense Refill   • Morphine Sulfate ER (Arymo ER) 15 MG tablet extended-release Take 15 mg by mouth.         Patient has no known allergies.    Family History   Problem Relation Age of Onset   • No Known Problems Mother    • Heart disease Father    • Arrhythmia Brother        Social History     Socioeconomic History   • Marital status:      Spouse name: Not on file   • Number of children: Not on file   • Years of education: Not on file   • Highest education level: Not on file   Tobacco Use   • Smoking status: Former Smoker     Types: Cigarettes   • Smokeless tobacco: Never Used   • Tobacco comment: quit in Nov 2019   Substance and Sexual Activity   • Alcohol use: Never     Frequency: Never       Review of Systems   Constitutional: Positive for fatigue and unexpected weight change. Negative for activity change, appetite change, chills, diaphoresis and fever.   HENT: Positive for sore throat, trouble swallowing and voice change. Negative for congestion, ear discharge, ear pain, facial swelling, hearing loss, mouth sores, nosebleeds, postnasal drip, rhinorrhea, sinus pressure, sneezing and tinnitus.         Cervical lymphadenopathy   Eyes: Negative.  Negative for pain, discharge, redness, itching and visual disturbance.   Respiratory: Positive for cough. Negative for apnea, choking, chest tightness, shortness of breath, wheezing and stridor.    Cardiovascular: Negative.    Gastrointestinal: Negative.  Negative for nausea and vomiting.   Endocrine: Negative for cold intolerance and heat intolerance.   Genitourinary: Negative.    Musculoskeletal: Positive for arthralgias and myalgias. Negative for back pain, gait problem, neck pain and neck stiffness.   Skin: Negative for rash.   Allergic/Immunologic: Negative.  Negative for environmental allergies and food  allergies.   Neurological: Positive for headaches. Negative for dizziness, tremors, seizures, syncope, facial asymmetry, speech difficulty, weakness, light-headedness and numbness.   Hematological: Negative.  Negative for adenopathy. Does not bruise/bleed easily.   Psychiatric/Behavioral: Negative.  Negative for behavioral problems, sleep disturbance and suicidal ideas. The patient is not nervous/anxious and is not hyperactive.        Vitals:    05/28/20 1544   BP: 112/57   Pulse: 56   Resp: 16   Temp: 96.9 °F (36.1 °C)       Body mass index is 18.09 kg/m².    Objective     Physical Exam  Physical Exam  CONSTITUTIONAL: thin, alert, oriented, in no acute distress      COMMUNICATION AND VOICE: able to communicate normally, normal voice quality     HEAD: normocephalic, no lesions, atraumatic, no tenderness, no masses      FACE: appearance normal, no lesions, no tenderness, no deformities, facial motion symmetric     SALIVARY GLANDS: parotid glands with no tenderness, no swelling, no masses, submandibular glands with normal size, nontender     EYES: ocular motility normal, eyelids normal, orbits normal, no proptosis, conjunctiva normal , pupils equal, round      EARS:  Hearing: response to conversational voice normal bilaterally   External Ears: auricles without lesions  Otoscopic: tympanic membrane appearance normal, no lesions, no perforation, normal mobility, no fluid     NOSE:  External Nose: structure normal, no tenderness on palpation, no nasal discharge, no lesions, no evidence of trauma, nostrils patent   Intranasal Exam: nasal mucosa normal, vestibule within normal limits, inferior turbinate normal, nasal septum midline   Nasopharynx:      ORAL:  Lips: upper and lower lips without lesion   Teeth: dentition within normal limits for age   Gums: gingivae healthy   Oral Mucosa: oral mucosa normal, no mucosal lesions   Floor of Mouth: Warthin’s duct patent, mucosa normal  Tongue: lingual mucosa normal without  lesions, normal tongue mobility   Palate: soft and hard palates with normal mucosa and structure  Oropharynx: oropharyngeal mucosa normal    NECK: neck appearance normal, no mass,  noted without erythema or tenderness     THYROID: no overt thyromegaly, no tenderness, nodules or mass present on palpation, position midline      LYMPH NODES: left lymphadenopathy, left medial supraclavicular and left level II neck     CHEST/RESPIRATORY: respiratory effort normaL     CARDIOVASCULAR: extremities without cyanosis or edema       NEUROLOGIC/PSYCHIATRIC: oriented to time, place and person, mood normal, affect appropriate, CN II-XII intact grossly    Assessment/Plan   Problems Addressed this Visit        Respiratory    Lung mass       Immune and Lymphatic    Supraclavicular lymphadenopathy - Primary    Relevant Orders    Case Request (Completed)    Comprehensive Metabolic Panel    CBC and Differential    ECG 12 Lead    XR Chest 2 View        LEFT SUPRACLAVICULAR LYMPH NODE BIOPSY/EXCISION (Left)  Orders Placed This Encounter   Procedures   • XR Chest 2 View     Standing Status:   Future     Standing Expiration Date:   5/28/2021     Order Specific Question:   Reason for Exam:     Answer:   HYPERTENSION     Order Specific Question:   Does this patient have a diabetic monitoring/medication delivering device on?     Answer:   No   • Comprehensive Metabolic Panel     Standing Status:   Future     Standing Expiration Date:   5/28/2021   • Follow Anesthesia Guidelines / Protocol     Standing Status:   Future   • Obtain Informed Consent     Order Specific Question:   Informed Consent Given For     Answer:   LEFT SUPRACLAVICULAR LYMPH NODE BIOPSY/EXCISION   • Provide Patient With Instructions on NPO Status     Standing Status:   Future   • ECG 12 Lead     Standing Status:   Future     Standing Expiration Date:   5/28/2021     Order Specific Question:   Reason for Exam:     Answer:   HYPERTENSION   • CBC and Differential     Standing  Status:   Future     Standing Expiration Date:   5/28/2021     Order Specific Question:   Manual Differential     Answer:   No     Return for Follow-up post-operatively as directed.       Patient Instructions   LEFT SUPRACLAVICULAR LYMPH NODE BIOPSY: The risks, benefits, and alternatives of the procedure including but not limited to pain, numbness, nerve injury, scarring, bleeding, infection, persistent symptoms, and risks of the anesthesia were discussed full with the patient and questions were answered. No guarantees were made or implied.      Dr. Garcia examined the patient and agrees with assessment and plan.     Male

## (undated) DEVICE — ENDO KIT,LOURDES HOSPITAL: Brand: MEDLINE INDUSTRIES, INC.

## (undated) DEVICE — GUIDEWIRE PEG SYSTEM: Brand: BARD DELUX GUIDEWIRE PEG SYSTEM

## (undated) DEVICE — SUT ETHLN 5/0 P3 18IN 698H

## (undated) DEVICE — PK ENT HD AND NK 30

## (undated) DEVICE — PK TURNOVER RM ADV

## (undated) DEVICE — SUT MNCRYL 4/0 P3 18IN UD MCP494G

## (undated) DEVICE — DRSNG BRDR MEPILEXLITE SLFADHR SIL 2X5

## (undated) DEVICE — GLV SURG BIOGEL M LTX PF 7 1/2

## (undated) DEVICE — SUT SILK 2/0 SH CR8 18IN CR8 C012D

## (undated) DEVICE — ANTIBACTERIAL UNDYED BRAIDED (POLYGLACTIN 910), SYNTHETIC ABSORBABLE SUTURE: Brand: COATED VICRYL

## (undated) DEVICE — SUT SILK 3/0 SUTUPAK TIES 24IN SA74H